# Patient Record
Sex: MALE | Race: WHITE | Employment: PART TIME | URBAN - METROPOLITAN AREA
[De-identification: names, ages, dates, MRNs, and addresses within clinical notes are randomized per-mention and may not be internally consistent; named-entity substitution may affect disease eponyms.]

---

## 2017-03-23 ENCOUNTER — HOSPITAL ENCOUNTER (OUTPATIENT)
Dept: PULMONOLOGY | Age: 72
Discharge: HOME | End: 2017-03-23
Attending: INTERNAL MEDICINE | Admitting: INTERNAL MEDICINE

## 2017-03-23 DIAGNOSIS — J44.0 CHRONIC OBSTRUCTIVE PULMONARY DISEASE WITH ACUTE LOWER RESPIRATORY INFECTION (HCC): ICD-10-CM

## 2018-01-25 ENCOUNTER — HOSPITAL ENCOUNTER (OUTPATIENT)
Dept: GENERAL RADIOLOGY | Age: 73
Discharge: HOME | End: 2018-01-25
Attending: INTERNAL MEDICINE | Admitting: INTERNAL MEDICINE

## 2018-01-25 LAB
ALBUMIN SERPL-MCNC: 3.7 GM/DL (ref 3.4–5)
ALP BLD-CCNC: 71 IU/L (ref 40–128)
ALT SERPL-CCNC: 15 U/L (ref 10–40)
ANION GAP SERPL CALCULATED.3IONS-SCNC: 12 MMOL/L (ref 4–16)
AST SERPL-CCNC: 19 IU/L (ref 15–37)
BASOPHILS ABSOLUTE: 0 K/CU MM
BASOPHILS RELATIVE PERCENT: 0.2 % (ref 0–1)
BILIRUB SERPL-MCNC: 0.4 MG/DL (ref 0–1)
BUN BLDV-MCNC: 12 MG/DL (ref 6–23)
CALCIUM SERPL-MCNC: 9.1 MG/DL (ref 8.3–10.6)
CHLORIDE BLD-SCNC: 99 MMOL/L (ref 99–110)
CHOLESTEROL: 185 MG/DL
CO2: 32 MMOL/L (ref 21–32)
CREAT SERPL-MCNC: 0.8 MG/DL (ref 0.9–1.3)
DIFFERENTIAL TYPE: ABNORMAL
EOSINOPHILS ABSOLUTE: 0.2 K/CU MM
EOSINOPHILS RELATIVE PERCENT: 4.7 % (ref 0–3)
GFR AFRICAN AMERICAN: >60 ML/MIN/1.73M2
GFR NON-AFRICAN AMERICAN: >60 ML/MIN/1.73M2
GLUCOSE BLD-MCNC: 108 MG/DL (ref 70–99)
HCT VFR BLD CALC: 50.2 % (ref 42–52)
HDLC SERPL-MCNC: 58 MG/DL
HEMOGLOBIN: 16.3 GM/DL (ref 13.5–18)
IMMATURE NEUTROPHIL %: 0.2 % (ref 0–0.43)
LDL CHOLESTEROL DIRECT: 121 MG/DL
LYMPHOCYTES ABSOLUTE: 0.9 K/CU MM
LYMPHOCYTES RELATIVE PERCENT: 18.3 % (ref 24–44)
MCH RBC QN AUTO: 33.8 PG (ref 27–31)
MCHC RBC AUTO-ENTMCNC: 32.5 % (ref 32–36)
MCV RBC AUTO: 104.1 FL (ref 78–100)
MONOCYTES ABSOLUTE: 0.7 K/CU MM
MONOCYTES RELATIVE PERCENT: 12.8 % (ref 0–4)
NUCLEATED RBC %: 0 %
PDW BLD-RTO: 13.2 % (ref 11.7–14.9)
PLATELET # BLD: 237 K/CU MM (ref 140–440)
PMV BLD AUTO: 9.2 FL (ref 7.5–11.1)
POTASSIUM SERPL-SCNC: 4.5 MMOL/L (ref 3.5–5.1)
RBC # BLD: 4.82 M/CU MM (ref 4.6–6.2)
SEGMENTED NEUTROPHILS ABSOLUTE COUNT: 3.3 K/CU MM
SEGMENTED NEUTROPHILS RELATIVE PERCENT: 63.8 % (ref 36–66)
SODIUM BLD-SCNC: 143 MMOL/L (ref 135–145)
TOTAL IMMATURE NEUTOROPHIL: 0.01 K/CU MM
TOTAL NUCLEATED RBC: 0 K/CU MM
TOTAL PROTEIN: 5.8 GM/DL (ref 6.4–8.2)
TRIGL SERPL-MCNC: 47 MG/DL
TSH HIGH SENSITIVITY: 0.97 UIU/ML (ref 0.27–4.2)
WBC # BLD: 5.2 K/CU MM (ref 4–10.5)

## 2018-10-18 ENCOUNTER — HOSPITAL ENCOUNTER (OUTPATIENT)
Age: 73
Discharge: HOME | End: 2018-10-18
Payer: MEDICARE

## 2018-10-18 LAB
ALBUMIN SERPL-MCNC: 3.8 GM/DL (ref 3.4–5)
ALP BLD-CCNC: 68 IU/L (ref 40–128)
ALT SERPL-CCNC: 12 U/L (ref 10–40)
ANION GAP SERPL CALCULATED.3IONS-SCNC: 13 MMOL/L (ref 4–16)
AST SERPL-CCNC: 18 IU/L (ref 15–37)
BILIRUB SERPL-MCNC: 0.5 MG/DL (ref 0–1)
BUN BLDV-MCNC: 12 MG/DL (ref 6–23)
CALCIUM SERPL-MCNC: 9 MG/DL (ref 8.3–10.6)
CHLORIDE BLD-SCNC: 97 MMOL/L (ref 99–110)
CHOLESTEROL: 182 MG/DL
CO2: 28 MMOL/L (ref 21–32)
CREAT SERPL-MCNC: 0.7 MG/DL (ref 0.9–1.3)
GFR AFRICAN AMERICAN: >60 ML/MIN/1.73M2
GFR NON-AFRICAN AMERICAN: >60 ML/MIN/1.73M2
GLUCOSE BLD-MCNC: 93 MG/DL (ref 70–99)
HCT VFR BLD CALC: 49.6 % (ref 42–52)
HDLC SERPL-MCNC: 69 MG/DL
HEMOGLOBIN: 16.3 GM/DL (ref 13.5–18)
LDL CHOLESTEROL DIRECT: 116 MG/DL
MCH RBC QN AUTO: 34.5 PG (ref 27–31)
MCHC RBC AUTO-ENTMCNC: 32.9 % (ref 32–36)
MCV RBC AUTO: 105.1 FL (ref 78–100)
PDW BLD-RTO: 12.9 % (ref 11.7–14.9)
PLATELET # BLD: 242 K/CU MM (ref 140–440)
PMV BLD AUTO: 9.1 FL (ref 7.5–11.1)
POTASSIUM SERPL-SCNC: 4.7 MMOL/L (ref 3.5–5.1)
PROSTATE SPECIFIC ANTIGEN: 2.17 NG/ML (ref 0–4)
RBC # BLD: 4.72 M/CU MM (ref 4.6–6.2)
SODIUM BLD-SCNC: 138 MMOL/L (ref 135–145)
TOTAL PROTEIN: 6.2 GM/DL (ref 6.4–8.2)
TRIGL SERPL-MCNC: 39 MG/DL
TSH HIGH SENSITIVITY: 0.78 UIU/ML (ref 0.27–4.2)
WBC # BLD: 7.1 K/CU MM (ref 4–10.5)

## 2018-10-18 PROCEDURE — 80061 LIPID PANEL: CPT

## 2018-10-18 PROCEDURE — 80053 COMPREHEN METABOLIC PANEL: CPT

## 2018-10-18 PROCEDURE — 84443 ASSAY THYROID STIM HORMONE: CPT

## 2018-10-18 PROCEDURE — G0103 PSA SCREENING: HCPCS

## 2018-10-18 PROCEDURE — 36415 COLL VENOUS BLD VENIPUNCTURE: CPT

## 2018-10-18 PROCEDURE — 83721 ASSAY OF BLOOD LIPOPROTEIN: CPT

## 2018-10-18 PROCEDURE — 85027 COMPLETE CBC AUTOMATED: CPT

## 2019-03-25 ENCOUNTER — HOSPITAL ENCOUNTER (OUTPATIENT)
Age: 74
Discharge: HOME | End: 2019-03-25
Payer: MEDICARE

## 2019-03-25 LAB
ALBUMIN SERPL-MCNC: 3.4 GM/DL (ref 3.4–5)
ALP BLD-CCNC: 64 IU/L (ref 40–129)
ALT SERPL-CCNC: 25 U/L (ref 10–40)
AST SERPL-CCNC: 29 IU/L (ref 15–37)
BASOPHILS ABSOLUTE: 0 K/CU MM
BASOPHILS RELATIVE PERCENT: 0.3 % (ref 0–1)
BILIRUB SERPL-MCNC: 0.2 MG/DL (ref 0–1)
BILIRUBIN DIRECT: 0.2 MG/DL (ref 0–0.3)
BILIRUBIN, INDIRECT: 0 MG/DL (ref 0–0.7)
DIFFERENTIAL TYPE: ABNORMAL
EOSINOPHILS ABSOLUTE: 0 K/CU MM
EOSINOPHILS RELATIVE PERCENT: 0 % (ref 0–3)
HCT VFR BLD CALC: 47.7 % (ref 42–52)
HEMOGLOBIN: 15 GM/DL (ref 13.5–18)
HEPATITIS B SURFACE ANTIGEN: NON REACTIVE
HEPATITIS C ANTIBODY: NON REACTIVE
IMMATURE NEUTROPHIL %: 1.3 % (ref 0–0.43)
LYMPHOCYTES ABSOLUTE: 0.7 K/CU MM
LYMPHOCYTES RELATIVE PERCENT: 10.5 % (ref 24–44)
MCH RBC QN AUTO: 33.2 PG (ref 27–31)
MCHC RBC AUTO-ENTMCNC: 31.4 % (ref 32–36)
MCV RBC AUTO: 105.5 FL (ref 78–100)
MONOCYTES ABSOLUTE: 0.2 K/CU MM
MONOCYTES RELATIVE PERCENT: 3.1 % (ref 0–4)
NUCLEATED RBC %: 0 %
PDW BLD-RTO: 12.9 % (ref 11.7–14.9)
PLATELET # BLD: 335 K/CU MM (ref 140–440)
PMV BLD AUTO: 9 FL (ref 7.5–11.1)
RBC # BLD: 4.52 M/CU MM (ref 4.6–6.2)
SEGMENTED NEUTROPHILS ABSOLUTE COUNT: 5.8 K/CU MM
SEGMENTED NEUTROPHILS RELATIVE PERCENT: 84.8 % (ref 36–66)
TOTAL IMMATURE NEUTOROPHIL: 0.09 K/CU MM
TOTAL NUCLEATED RBC: 0 K/CU MM
TOTAL PROTEIN: 5.9 GM/DL (ref 6.4–8.2)
TSH HIGH SENSITIVITY: 0.63 UIU/ML (ref 0.27–4.2)
WBC # BLD: 6.8 K/CU MM (ref 4–10.5)

## 2019-03-25 PROCEDURE — 86803 HEPATITIS C AB TEST: CPT

## 2019-03-25 PROCEDURE — 85025 COMPLETE CBC W/AUTO DIFF WBC: CPT

## 2019-03-25 PROCEDURE — 80076 HEPATIC FUNCTION PANEL: CPT

## 2019-03-25 PROCEDURE — 36415 COLL VENOUS BLD VENIPUNCTURE: CPT

## 2019-03-25 PROCEDURE — 86704 HEP B CORE ANTIBODY TOTAL: CPT

## 2019-03-25 PROCEDURE — 84443 ASSAY THYROID STIM HORMONE: CPT

## 2019-03-25 PROCEDURE — 87340 HEPATITIS B SURFACE AG IA: CPT

## 2019-03-27 LAB
HEPATITIS B CORE TOTAL ANTIBODY: NEGATIVE
HEPATITIS B CORE TOTAL ANTIBODY: NORMAL

## 2019-11-11 ENCOUNTER — HOSPITAL ENCOUNTER (OUTPATIENT)
Age: 74
Discharge: HOME | End: 2019-11-11
Payer: MEDICARE

## 2019-11-11 ENCOUNTER — HOSPITAL ENCOUNTER (OUTPATIENT)
Dept: GENERAL RADIOLOGY | Age: 74
Discharge: HOME | End: 2019-11-11
Payer: MEDICARE

## 2019-11-11 DIAGNOSIS — R05.9 COUGH: ICD-10-CM

## 2019-11-11 DIAGNOSIS — J44.9 CHRONIC OBSTRUCTIVE PULMONARY DISEASE, UNSPECIFIED COPD TYPE (HCC): ICD-10-CM

## 2019-11-11 LAB
ALBUMIN SERPL-MCNC: 3.4 GM/DL (ref 3.4–5)
ALP BLD-CCNC: 66 IU/L (ref 40–128)
ALT SERPL-CCNC: 14 U/L (ref 10–40)
ANION GAP SERPL CALCULATED.3IONS-SCNC: 14 MMOL/L (ref 4–16)
AST SERPL-CCNC: 17 IU/L (ref 15–37)
BASOPHILS ABSOLUTE: 0 K/CU MM
BASOPHILS RELATIVE PERCENT: 0.1 % (ref 0–1)
BILIRUB SERPL-MCNC: 0.4 MG/DL (ref 0–1)
BUN BLDV-MCNC: 24 MG/DL (ref 6–23)
CALCIUM SERPL-MCNC: 8.7 MG/DL (ref 8.3–10.6)
CHLORIDE BLD-SCNC: 99 MMOL/L (ref 99–110)
CHOLESTEROL, FASTING: 157 MG/DL
CO2: 28 MMOL/L (ref 21–32)
CREAT SERPL-MCNC: 0.8 MG/DL (ref 0.9–1.3)
DIFFERENTIAL TYPE: ABNORMAL
EOSINOPHILS ABSOLUTE: 0 K/CU MM
EOSINOPHILS RELATIVE PERCENT: 0 % (ref 0–3)
GFR AFRICAN AMERICAN: >60 ML/MIN/1.73M2
GFR NON-AFRICAN AMERICAN: >60 ML/MIN/1.73M2
GLUCOSE BLD-MCNC: 70 MG/DL (ref 70–99)
HCT VFR BLD CALC: 45.6 % (ref 42–52)
HDLC SERPL-MCNC: 79 MG/DL
HEMOGLOBIN: 14.4 GM/DL (ref 13.5–18)
IMMATURE NEUTROPHIL %: 0.5 % (ref 0–0.43)
LDL CHOLESTEROL DIRECT: 74 MG/DL
LYMPHOCYTES ABSOLUTE: 0.4 K/CU MM
LYMPHOCYTES RELATIVE PERCENT: 3.1 % (ref 24–44)
MCH RBC QN AUTO: 33.9 PG (ref 27–31)
MCHC RBC AUTO-ENTMCNC: 31.6 % (ref 32–36)
MCV RBC AUTO: 107.3 FL (ref 78–100)
MONOCYTES ABSOLUTE: 0.4 K/CU MM
MONOCYTES RELATIVE PERCENT: 3.4 % (ref 0–4)
NUCLEATED RBC %: 0 %
PDW BLD-RTO: 14.6 % (ref 11.7–14.9)
PLATELET # BLD: 241 K/CU MM (ref 140–440)
PMV BLD AUTO: 9.1 FL (ref 7.5–11.1)
POTASSIUM SERPL-SCNC: 4.5 MMOL/L (ref 3.5–5.1)
RBC # BLD: 4.25 M/CU MM (ref 4.6–6.2)
SEGMENTED NEUTROPHILS ABSOLUTE COUNT: 11.9 K/CU MM
SEGMENTED NEUTROPHILS RELATIVE PERCENT: 92.9 % (ref 36–66)
SODIUM BLD-SCNC: 141 MMOL/L (ref 135–145)
TOTAL IMMATURE NEUTOROPHIL: 0.06 K/CU MM
TOTAL NUCLEATED RBC: 0 K/CU MM
TOTAL PROTEIN: 5.8 GM/DL (ref 6.4–8.2)
TRIGLYCERIDE, FASTING: 58 MG/DL
TSH HIGH SENSITIVITY: 0.96 UIU/ML (ref 0.27–4.2)
WBC # BLD: 12.8 K/CU MM (ref 4–10.5)

## 2019-11-11 PROCEDURE — 80061 LIPID PANEL: CPT

## 2019-11-11 PROCEDURE — 80053 COMPREHEN METABOLIC PANEL: CPT

## 2019-11-11 PROCEDURE — 84443 ASSAY THYROID STIM HORMONE: CPT

## 2019-11-11 PROCEDURE — 71046 X-RAY EXAM CHEST 2 VIEWS: CPT

## 2019-11-11 PROCEDURE — 85025 COMPLETE CBC W/AUTO DIFF WBC: CPT

## 2019-11-11 PROCEDURE — 36415 COLL VENOUS BLD VENIPUNCTURE: CPT

## 2020-03-02 ENCOUNTER — HOSPITAL ENCOUNTER (OUTPATIENT)
Dept: GENERAL RADIOLOGY | Age: 75
Discharge: HOME | End: 2020-03-02
Payer: MEDICARE

## 2020-03-02 ENCOUNTER — HOSPITAL ENCOUNTER (OUTPATIENT)
Age: 75
Discharge: HOME | End: 2020-03-02
Payer: MEDICARE

## 2020-03-02 PROCEDURE — 71046 X-RAY EXAM CHEST 2 VIEWS: CPT

## 2020-03-09 PROBLEM — J44.9 COPD, SEVERE (HCC): Status: ACTIVE | Noted: 2020-03-09

## 2020-05-05 ENCOUNTER — HOSPITAL ENCOUNTER (OUTPATIENT)
Age: 75
Discharge: HOME | End: 2020-05-05
Payer: MEDICARE

## 2020-05-05 ENCOUNTER — HOSPITAL ENCOUNTER (OUTPATIENT)
Dept: GENERAL RADIOLOGY | Age: 75
Discharge: HOME | End: 2020-05-05
Payer: MEDICARE

## 2020-05-05 PROCEDURE — 71046 X-RAY EXAM CHEST 2 VIEWS: CPT

## 2021-11-29 ENCOUNTER — HOSPITAL ENCOUNTER (OUTPATIENT)
Age: 76
Discharge: HOME | End: 2021-11-29
Payer: MEDICARE

## 2021-11-29 LAB
ALBUMIN SERPL-MCNC: 3.9 GM/DL (ref 3.4–5)
ALP BLD-CCNC: 83 IU/L (ref 40–128)
ALT SERPL-CCNC: 12 U/L (ref 10–40)
ANION GAP SERPL CALCULATED.3IONS-SCNC: 6 MMOL/L (ref 4–16)
AST SERPL-CCNC: 18 IU/L (ref 15–37)
BASOPHILS ABSOLUTE: 0 K/CU MM
BASOPHILS RELATIVE PERCENT: 0.4 % (ref 0–1)
BILIRUB SERPL-MCNC: 0.5 MG/DL (ref 0–1)
BUN BLDV-MCNC: 19 MG/DL (ref 6–23)
C-REACTIVE PROTEIN, HIGH SENSITIVITY: 3.2 MG/L
CALCIUM SERPL-MCNC: 9.1 MG/DL (ref 8.3–10.6)
CHLORIDE BLD-SCNC: 101 MMOL/L (ref 99–110)
CHOLESTEROL: 188 MG/DL
CO2: 32 MMOL/L (ref 21–32)
CREAT SERPL-MCNC: 0.7 MG/DL (ref 0.9–1.3)
DIFFERENTIAL TYPE: ABNORMAL
EOSINOPHILS ABSOLUTE: 0.3 K/CU MM
EOSINOPHILS RELATIVE PERCENT: 4.9 % (ref 0–3)
GFR AFRICAN AMERICAN: >60 ML/MIN/1.73M2
GFR NON-AFRICAN AMERICAN: >60 ML/MIN/1.73M2
GLUCOSE BLD-MCNC: 84 MG/DL (ref 70–99)
HCT VFR BLD CALC: 46.9 % (ref 42–52)
HDLC SERPL-MCNC: 65 MG/DL
HEMOGLOBIN: 15.4 GM/DL (ref 13.5–18)
IMMATURE NEUTROPHIL %: 0.2 % (ref 0–0.43)
LDL CHOLESTEROL DIRECT: 114 MG/DL
LYMPHOCYTES ABSOLUTE: 1.6 K/CU MM
LYMPHOCYTES RELATIVE PERCENT: 29.8 % (ref 24–44)
MCH RBC QN AUTO: 33.5 PG (ref 27–31)
MCHC RBC AUTO-ENTMCNC: 32.8 % (ref 32–36)
MCV RBC AUTO: 102 FL (ref 78–100)
MONOCYTES ABSOLUTE: 0.5 K/CU MM
MONOCYTES RELATIVE PERCENT: 9.7 % (ref 0–4)
NUCLEATED RBC %: 0 %
PDW BLD-RTO: 13.1 % (ref 11.7–14.9)
PLATELET # BLD: 229 K/CU MM (ref 140–440)
PMV BLD AUTO: 9.1 FL (ref 7.5–11.1)
POTASSIUM SERPL-SCNC: 4.7 MMOL/L (ref 3.5–5.1)
PROSTATE SPECIFIC ANTIGEN: 2.91 NG/ML (ref 0–4)
RBC # BLD: 4.6 M/CU MM (ref 4.6–6.2)
SEGMENTED NEUTROPHILS ABSOLUTE COUNT: 2.9 K/CU MM
SEGMENTED NEUTROPHILS RELATIVE PERCENT: 55 % (ref 36–66)
SODIUM BLD-SCNC: 139 MMOL/L (ref 135–145)
T4 FREE: 1.11 NG/DL (ref 0.9–1.8)
TOTAL IMMATURE NEUTOROPHIL: 0.01 K/CU MM
TOTAL NUCLEATED RBC: 0 K/CU MM
TOTAL PROTEIN: 6.1 GM/DL (ref 6.4–8.2)
TRIGL SERPL-MCNC: 55 MG/DL
TSH HIGH SENSITIVITY: 1.6 UIU/ML (ref 0.27–4.2)
WBC # BLD: 5.3 K/CU MM (ref 4–10.5)

## 2021-11-29 PROCEDURE — 83721 ASSAY OF BLOOD LIPOPROTEIN: CPT

## 2021-11-29 PROCEDURE — 85025 COMPLETE CBC W/AUTO DIFF WBC: CPT

## 2021-11-29 PROCEDURE — 86140 C-REACTIVE PROTEIN: CPT

## 2021-11-29 PROCEDURE — 36415 COLL VENOUS BLD VENIPUNCTURE: CPT

## 2021-11-29 PROCEDURE — 80061 LIPID PANEL: CPT

## 2021-11-29 PROCEDURE — G0103 PSA SCREENING: HCPCS

## 2021-11-29 PROCEDURE — 80053 COMPREHEN METABOLIC PANEL: CPT

## 2021-11-29 PROCEDURE — 84439 ASSAY OF FREE THYROXINE: CPT

## 2021-11-29 PROCEDURE — 84443 ASSAY THYROID STIM HORMONE: CPT

## 2022-07-18 ENCOUNTER — HOSPITAL ENCOUNTER (OUTPATIENT)
Age: 77
Discharge: HOME | End: 2022-07-18
Payer: MEDICARE

## 2022-07-18 LAB
ALBUMIN SERPL-MCNC: 3.9 GM/DL (ref 3.4–5)
ALP BLD-CCNC: 77 IU/L (ref 40–129)
ALT SERPL-CCNC: 14 U/L (ref 10–40)
ANION GAP SERPL CALCULATED.3IONS-SCNC: 7 MMOL/L (ref 4–16)
AST SERPL-CCNC: 21 IU/L (ref 15–37)
BASOPHILS ABSOLUTE: 0 K/CU MM
BASOPHILS RELATIVE PERCENT: 0.4 % (ref 0–1)
BILIRUB SERPL-MCNC: 0.4 MG/DL (ref 0–1)
BUN BLDV-MCNC: 20 MG/DL (ref 6–23)
CALCIUM SERPL-MCNC: 8.8 MG/DL (ref 8.3–10.6)
CHLORIDE BLD-SCNC: 106 MMOL/L (ref 99–110)
CO2: 30 MMOL/L (ref 21–32)
CREAT SERPL-MCNC: 0.8 MG/DL (ref 0.9–1.3)
DIFFERENTIAL TYPE: ABNORMAL
EOSINOPHILS ABSOLUTE: 0.1 K/CU MM
EOSINOPHILS RELATIVE PERCENT: 2.2 % (ref 0–3)
GFR AFRICAN AMERICAN: >60 ML/MIN/1.73M2
GFR NON-AFRICAN AMERICAN: >60 ML/MIN/1.73M2
GLUCOSE BLD-MCNC: 66 MG/DL (ref 70–99)
HCT VFR BLD CALC: 45.2 % (ref 42–52)
HEMOGLOBIN: 14.7 GM/DL (ref 13.5–18)
IMMATURE NEUTROPHIL %: 0.2 % (ref 0–0.43)
LYMPHOCYTES ABSOLUTE: 1.5 K/CU MM
LYMPHOCYTES RELATIVE PERCENT: 27.5 % (ref 24–44)
MCH RBC QN AUTO: 33.6 PG (ref 27–31)
MCHC RBC AUTO-ENTMCNC: 32.5 % (ref 32–36)
MCV RBC AUTO: 103.2 FL (ref 78–100)
MONOCYTES ABSOLUTE: 0.5 K/CU MM
MONOCYTES RELATIVE PERCENT: 9.1 % (ref 0–4)
NUCLEATED RBC %: 0 %
PDW BLD-RTO: 13.4 % (ref 11.7–14.9)
PLATELET # BLD: 206 K/CU MM (ref 140–440)
PMV BLD AUTO: 9.2 FL (ref 7.5–11.1)
POTASSIUM SERPL-SCNC: 4.8 MMOL/L (ref 3.5–5.1)
RBC # BLD: 4.38 M/CU MM (ref 4.6–6.2)
SEGMENTED NEUTROPHILS ABSOLUTE COUNT: 3.3 K/CU MM
SEGMENTED NEUTROPHILS RELATIVE PERCENT: 60.6 % (ref 36–66)
SODIUM BLD-SCNC: 143 MMOL/L (ref 135–145)
TOTAL IMMATURE NEUTOROPHIL: 0.01 K/CU MM
TOTAL NUCLEATED RBC: 0 K/CU MM
TOTAL PROTEIN: 5.7 GM/DL (ref 6.4–8.2)
WBC # BLD: 5.4 K/CU MM (ref 4–10.5)

## 2022-07-18 PROCEDURE — 85025 COMPLETE CBC W/AUTO DIFF WBC: CPT

## 2022-07-18 PROCEDURE — 36415 COLL VENOUS BLD VENIPUNCTURE: CPT

## 2022-07-18 PROCEDURE — 82955 ASSAY OF G6PD ENZYME: CPT

## 2022-07-18 PROCEDURE — 80053 COMPREHEN METABOLIC PANEL: CPT

## 2022-07-20 LAB — GLUCOSE-6-PHOSPHATE DEHYDROGENASE QUAL: 10.3 U/G HB (ref 9.9–16.6)

## 2022-11-23 ENCOUNTER — HOSPITAL ENCOUNTER (OUTPATIENT)
Age: 77
Discharge: HOME | End: 2022-11-23
Payer: MEDICARE

## 2022-11-23 LAB
ALBUMIN SERPL-MCNC: 3.8 GM/DL (ref 3.4–5)
ALP BLD-CCNC: 78 IU/L (ref 40–128)
ALT SERPL-CCNC: 14 U/L (ref 10–40)
ANION GAP SERPL CALCULATED.3IONS-SCNC: 8 MMOL/L (ref 4–16)
AST SERPL-CCNC: 17 IU/L (ref 15–37)
BASOPHILS ABSOLUTE: 0 K/CU MM
BASOPHILS RELATIVE PERCENT: 0.3 % (ref 0–1)
BILIRUB SERPL-MCNC: 0.6 MG/DL (ref 0–1)
BUN BLDV-MCNC: 15 MG/DL (ref 6–23)
CALCIUM SERPL-MCNC: 9.2 MG/DL (ref 8.3–10.6)
CHLORIDE BLD-SCNC: 99 MMOL/L (ref 99–110)
CHOLESTEROL: 193 MG/DL
CO2: 29 MMOL/L (ref 21–32)
CREAT SERPL-MCNC: 0.6 MG/DL (ref 0.9–1.3)
DIFFERENTIAL TYPE: ABNORMAL
EOSINOPHILS ABSOLUTE: 0.1 K/CU MM
EOSINOPHILS RELATIVE PERCENT: 1.3 % (ref 0–3)
GFR SERPL CREATININE-BSD FRML MDRD: >60 ML/MIN/1.73M2
GLUCOSE BLD-MCNC: 95 MG/DL (ref 70–99)
HCT VFR BLD CALC: 45.5 % (ref 42–52)
HDLC SERPL-MCNC: 62 MG/DL
HEMOGLOBIN: 14.4 GM/DL (ref 13.5–18)
IMMATURE NEUTROPHIL %: 0.3 % (ref 0–0.43)
LDL CHOLESTEROL CALCULATED: 124 MG/DL
LDL CHOLESTEROL DIRECT: 122 MG/DL
LYMPHOCYTES ABSOLUTE: 1.2 K/CU MM
LYMPHOCYTES RELATIVE PERCENT: 16.9 % (ref 24–44)
MCH RBC QN AUTO: 33.2 PG (ref 27–31)
MCHC RBC AUTO-ENTMCNC: 31.6 % (ref 32–36)
MCV RBC AUTO: 104.8 FL (ref 78–100)
MONOCYTES ABSOLUTE: 0.6 K/CU MM
MONOCYTES RELATIVE PERCENT: 9 % (ref 0–4)
NUCLEATED RBC %: 0 %
PDW BLD-RTO: 12.9 % (ref 11.7–14.9)
PLATELET # BLD: 290 K/CU MM (ref 140–440)
PMV BLD AUTO: 8.9 FL (ref 7.5–11.1)
POTASSIUM SERPL-SCNC: 4.5 MMOL/L (ref 3.5–5.1)
RBC # BLD: 4.34 M/CU MM (ref 4.6–6.2)
SEGMENTED NEUTROPHILS ABSOLUTE COUNT: 4.9 K/CU MM
SEGMENTED NEUTROPHILS RELATIVE PERCENT: 72.2 % (ref 36–66)
SODIUM BLD-SCNC: 136 MMOL/L (ref 135–145)
T4 FREE: 1.3 NG/DL (ref 0.9–1.8)
TOTAL IMMATURE NEUTOROPHIL: 0.02 K/CU MM
TOTAL NUCLEATED RBC: 0 K/CU MM
TOTAL PROTEIN: 6.1 GM/DL (ref 6.4–8.2)
TRIGL SERPL-MCNC: 37 MG/DL
TSH HIGH SENSITIVITY: 0.98 UIU/ML (ref 0.27–4.2)
WBC # BLD: 6.8 K/CU MM (ref 4–10.5)

## 2022-11-23 PROCEDURE — 80053 COMPREHEN METABOLIC PANEL: CPT

## 2022-11-23 PROCEDURE — 83721 ASSAY OF BLOOD LIPOPROTEIN: CPT

## 2022-11-23 PROCEDURE — 84443 ASSAY THYROID STIM HORMONE: CPT

## 2022-11-23 PROCEDURE — 36415 COLL VENOUS BLD VENIPUNCTURE: CPT

## 2022-11-23 PROCEDURE — 80061 LIPID PANEL: CPT

## 2022-11-23 PROCEDURE — 84439 ASSAY OF FREE THYROXINE: CPT

## 2022-11-23 PROCEDURE — 85025 COMPLETE CBC W/AUTO DIFF WBC: CPT

## 2023-08-17 ENCOUNTER — HOSPITAL ENCOUNTER (OUTPATIENT)
Age: 78
Discharge: HOME | End: 2023-08-17
Payer: MEDICARE

## 2023-08-17 LAB
ALBUMIN SERPL-MCNC: 3.9 GM/DL (ref 3.4–5)
ALP BLD-CCNC: 87 IU/L (ref 40–129)
ALT SERPL-CCNC: 16 U/L (ref 10–40)
ANION GAP SERPL CALCULATED.3IONS-SCNC: 8 MMOL/L (ref 4–16)
AST SERPL-CCNC: 19 IU/L (ref 15–37)
BASOPHILS ABSOLUTE: 0 K/CU MM
BASOPHILS RELATIVE PERCENT: 0.4 % (ref 0–1)
BILIRUB SERPL-MCNC: 0.4 MG/DL (ref 0–1)
BUN SERPL-MCNC: 21 MG/DL (ref 6–23)
CALCIUM SERPL-MCNC: 8.5 MG/DL (ref 8.3–10.6)
CHLORIDE BLD-SCNC: 101 MMOL/L (ref 99–110)
CO2: 29 MMOL/L (ref 21–32)
CREAT SERPL-MCNC: 1.1 MG/DL (ref 0.9–1.3)
DIFFERENTIAL TYPE: ABNORMAL
EOSINOPHILS ABSOLUTE: 0.1 K/CU MM
EOSINOPHILS RELATIVE PERCENT: 2.3 % (ref 0–3)
GFR SERPL CREATININE-BSD FRML MDRD: >60 ML/MIN/1.73M2
GLUCOSE SERPL-MCNC: 84 MG/DL (ref 70–99)
HCT VFR BLD CALC: 45.5 % (ref 42–52)
HEMOGLOBIN: 14.5 GM/DL (ref 13.5–18)
IMMATURE NEUTROPHIL %: 0.2 % (ref 0–0.43)
LYMPHOCYTES ABSOLUTE: 1.1 K/CU MM
LYMPHOCYTES RELATIVE PERCENT: 20 % (ref 24–44)
MCH RBC QN AUTO: 32.8 PG (ref 27–31)
MCHC RBC AUTO-ENTMCNC: 31.9 % (ref 32–36)
MCV RBC AUTO: 102.9 FL (ref 78–100)
MONOCYTES ABSOLUTE: 0.6 K/CU MM
MONOCYTES RELATIVE PERCENT: 10 % (ref 0–4)
NUCLEATED RBC %: 0 %
PDW BLD-RTO: 12.9 % (ref 11.7–14.9)
PLATELET # BLD: 236 K/CU MM (ref 140–440)
PMV BLD AUTO: 9.2 FL (ref 7.5–11.1)
POTASSIUM SERPL-SCNC: 4.6 MMOL/L (ref 3.5–5.1)
RBC # BLD: 4.42 M/CU MM (ref 4.6–6.2)
SEGMENTED NEUTROPHILS ABSOLUTE COUNT: 3.8 K/CU MM
SEGMENTED NEUTROPHILS RELATIVE PERCENT: 67.1 % (ref 36–66)
SODIUM BLD-SCNC: 138 MMOL/L (ref 135–145)
TOTAL IMMATURE NEUTOROPHIL: 0.01 K/CU MM
TOTAL NUCLEATED RBC: 0 K/CU MM
TOTAL PROTEIN: 6 GM/DL (ref 6.4–8.2)
WBC # BLD: 5.6 K/CU MM (ref 4–10.5)

## 2023-08-17 PROCEDURE — 80053 COMPREHEN METABOLIC PANEL: CPT

## 2023-08-17 PROCEDURE — 36415 COLL VENOUS BLD VENIPUNCTURE: CPT

## 2023-08-17 PROCEDURE — 85025 COMPLETE CBC W/AUTO DIFF WBC: CPT

## 2023-09-14 ENCOUNTER — HOSPITAL ENCOUNTER (OUTPATIENT)
Age: 78
Discharge: HOME | End: 2023-09-14
Payer: MEDICARE

## 2023-09-14 ENCOUNTER — HOSPITAL ENCOUNTER (OUTPATIENT)
Dept: GENERAL RADIOLOGY | Age: 78
Discharge: HOME | End: 2023-09-14
Payer: MEDICARE

## 2023-09-14 DIAGNOSIS — R06.09 DOE (DYSPNEA ON EXERTION): ICD-10-CM

## 2023-09-14 PROCEDURE — 71046 X-RAY EXAM CHEST 2 VIEWS: CPT

## 2023-12-11 ENCOUNTER — HOSPITAL ENCOUNTER (OUTPATIENT)
Age: 78
Discharge: HOME | End: 2023-12-11
Payer: MEDICARE

## 2023-12-11 LAB
ALBUMIN SERPL-MCNC: 4 GM/DL (ref 3.4–5)
ALP BLD-CCNC: 100 IU/L (ref 40–129)
ALT SERPL-CCNC: 19 U/L (ref 10–40)
ANION GAP SERPL CALCULATED.3IONS-SCNC: 10 MMOL/L (ref 4–16)
AST SERPL-CCNC: 24 IU/L (ref 15–37)
BASOPHILS ABSOLUTE: 0 K/CU MM
BASOPHILS RELATIVE PERCENT: 0.2 % (ref 0–1)
BILIRUB SERPL-MCNC: 0.7 MG/DL (ref 0–1)
BUN SERPL-MCNC: 14 MG/DL (ref 6–23)
CALCIUM SERPL-MCNC: 8.8 MG/DL (ref 8.3–10.6)
CHLORIDE BLD-SCNC: 102 MMOL/L (ref 99–110)
CO2: 30 MMOL/L (ref 21–32)
CREAT SERPL-MCNC: 0.7 MG/DL (ref 0.9–1.3)
DIFFERENTIAL TYPE: ABNORMAL
EOSINOPHILS ABSOLUTE: 0 K/CU MM
EOSINOPHILS RELATIVE PERCENT: 0.1 % (ref 0–3)
GFR SERPL CREATININE-BSD FRML MDRD: >60 ML/MIN/1.73M2
GLUCOSE SERPL-MCNC: 126 MG/DL (ref 70–99)
HCT VFR BLD CALC: 48.4 % (ref 42–52)
HEMOGLOBIN: 15.4 GM/DL (ref 13.5–18)
IMMATURE NEUTROPHIL %: 1 % (ref 0–0.43)
LYMPHOCYTES ABSOLUTE: 0.6 K/CU MM
LYMPHOCYTES RELATIVE PERCENT: 6.3 % (ref 24–44)
MCH RBC QN AUTO: 32.5 PG (ref 27–31)
MCHC RBC AUTO-ENTMCNC: 31.8 % (ref 32–36)
MCV RBC AUTO: 102.1 FL (ref 78–100)
MONOCYTES ABSOLUTE: 0.6 K/CU MM
MONOCYTES RELATIVE PERCENT: 6.3 % (ref 0–4)
NUCLEATED RBC %: 0 %
PDW BLD-RTO: 13.3 % (ref 11.7–14.9)
PLATELET # BLD: 232 K/CU MM (ref 140–440)
PMV BLD AUTO: 9.2 FL (ref 7.5–11.1)
POTASSIUM SERPL-SCNC: 5.1 MMOL/L (ref 3.5–5.1)
RBC # BLD: 4.74 M/CU MM (ref 4.6–6.2)
SEGMENTED NEUTROPHILS ABSOLUTE COUNT: 8.1 K/CU MM
SEGMENTED NEUTROPHILS RELATIVE PERCENT: 86.1 % (ref 36–66)
SODIUM BLD-SCNC: 142 MMOL/L (ref 135–145)
TOTAL IMMATURE NEUTOROPHIL: 0.09 K/CU MM
TOTAL NUCLEATED RBC: 0 K/CU MM
TOTAL PROTEIN: 6.7 GM/DL (ref 6.4–8.2)
WBC # BLD: 9.4 K/CU MM (ref 4–10.5)

## 2023-12-11 PROCEDURE — 80061 LIPID PANEL: CPT

## 2023-12-11 PROCEDURE — 86140 C-REACTIVE PROTEIN: CPT

## 2023-12-11 PROCEDURE — 85025 COMPLETE CBC W/AUTO DIFF WBC: CPT

## 2023-12-11 PROCEDURE — 80053 COMPREHEN METABOLIC PANEL: CPT

## 2023-12-11 PROCEDURE — 36415 COLL VENOUS BLD VENIPUNCTURE: CPT

## 2023-12-11 PROCEDURE — G0103 PSA SCREENING: HCPCS

## 2023-12-13 LAB
CHOLEST SERPL-MCNC: 182 MG/DL
CRP SERPL HS-MCNC: 10.7 MG/L
HDLC SERPL-MCNC: 64 MG/DL
LDLC SERPL CALC-MCNC: 109 MG/DL
PROSTATE SPECIFIC ANTIGEN: 3.28 NG/ML (ref 0–4)
TRIGL SERPL-MCNC: 45 MG/DL

## 2023-12-19 PROBLEM — N40.0 BPH (BENIGN PROSTATIC HYPERPLASIA): Status: ACTIVE | Noted: 2023-12-19

## 2024-02-15 ENCOUNTER — OFFICE VISIT (OUTPATIENT)
Dept: FAMILY MEDICINE CLINIC | Age: 79
End: 2024-02-15
Payer: MEDICARE

## 2024-02-15 VITALS
WEIGHT: 144 LBS | BODY MASS INDEX: 23.14 KG/M2 | SYSTOLIC BLOOD PRESSURE: 126 MMHG | HEIGHT: 66 IN | OXYGEN SATURATION: 93 % | TEMPERATURE: 97.3 F | HEART RATE: 90 BPM | DIASTOLIC BLOOD PRESSURE: 80 MMHG

## 2024-02-15 DIAGNOSIS — J40 BRONCHITIS: ICD-10-CM

## 2024-02-15 DIAGNOSIS — J44.9 COPD, SEVERE (HCC): Primary | ICD-10-CM

## 2024-02-15 PROCEDURE — 99213 OFFICE O/P EST LOW 20 MIN: CPT | Performed by: INTERNAL MEDICINE

## 2024-02-15 PROCEDURE — 1123F ACP DISCUSS/DSCN MKR DOCD: CPT | Performed by: INTERNAL MEDICINE

## 2024-02-15 RX ORDER — PREDNISONE 10 MG/1
TABLET ORAL
Qty: 20 TABLET | Refills: 0 | Status: SHIPPED | OUTPATIENT
Start: 2024-02-15

## 2024-02-15 RX ORDER — TAMSULOSIN HYDROCHLORIDE 0.4 MG/1
0.8 CAPSULE ORAL DAILY
Qty: 60 CAPSULE | Refills: 5 | Status: SHIPPED | OUTPATIENT
Start: 2024-02-15 | End: 2024-08-13

## 2024-02-15 RX ORDER — DAPSONE 25 MG/1
TABLET ORAL
COMMUNITY
Start: 2024-01-22

## 2024-02-15 RX ORDER — CLARITHROMYCIN 250 MG/1
250 TABLET, FILM COATED ORAL 2 TIMES DAILY
Qty: 20 TABLET | Refills: 0 | Status: SHIPPED | OUTPATIENT
Start: 2024-02-15 | End: 2024-02-25

## 2024-02-15 RX ORDER — ASPIRIN 81 MG/1
TABLET, CHEWABLE ORAL
COMMUNITY
Start: 2023-10-24

## 2024-02-15 RX ORDER — MULTIVIT WITH MINERALS/LUTEIN
1000 TABLET ORAL
COMMUNITY
Start: 2023-10-24

## 2024-02-15 NOTE — PROGRESS NOTES
Outpatient Clinic Visit Note    Patient: Kofi Pineda  : 1945 (79 y.o.)  Date: 2/15/2024    CC:  Chief Complaint   Patient presents with    Congestion    Cough     Patient has congestion, productive cough and chills/sweats        HPI: mildly yellow sputum with occ chills.    Past Medical History:    Past Medical History:   Diagnosis Date    COPD (chronic obstructive pulmonary disease) (HCC)     Emphysema of lung (HCC)        Past Surgical History:  History reviewed. No pertinent surgical history.    Home Medications:  Current Outpatient Medications   Medication Sig Dispense Refill    Multiple Vitamin (MVI, CELEBRATE, CHEWABLE TABLET) Take by mouth      Ascorbic Acid (VITAMIN C) 1000 MG tablet Take 1 tablet by mouth      aspirin 81 MG chewable tablet Take by mouth      dapsone 25 MG tablet       tamsulosin (FLOMAX) 0.4 MG capsule Take 2 capsules by mouth daily 60 capsule 5    clarithromycin (BIAXIN) 250 MG tablet Take 1 tablet by mouth 2 times daily for 10 days 20 tablet 0    predniSONE (DELTASONE) 10 MG tablet 4 for 2 days, 3 for 2 days, 2 for 2 days, 1 for 2 days 20 tablet 0    BREZTRI AEROSPHERE 160-9-4.8 MCG/ACT AERO INHALE TWO PUFFS BY MOUTH TWICE A DAY 10.7 g 5    triamcinolone (KENALOG) 0.1 % ointment       montelukast (SINGULAIR) 10 MG tablet       albuterol sulfate HFA (PROVENTIL;VENTOLIN;PROAIR) 108 (90 Base) MCG/ACT inhaler Inhale 2 puffs into the lungs every 6 hours as needed for Wheezing       No current facility-administered medications for this visit.        Allergies:    Patient has no known allergies.    Family History:   History reviewed. No pertinent family history.     ROS: A 10-organ Review Of Systems was obtained and otherwise unremarkable except as per HPI.    Data: Old records have been reviewed electronically.    PHYSICAL EXAM:  /80 (Site: Right Upper Arm, Position: Sitting, Cuff Size: Large Adult)   Pulse 90   Temp 97.3 °F (36.3 °C)   Ht 1.676 m (5' 5.98\")   Wt 65.3 kg

## 2024-02-16 ENCOUNTER — TELEPHONE (OUTPATIENT)
Dept: FAMILY MEDICINE CLINIC | Age: 79
End: 2024-02-16

## 2024-02-16 NOTE — TELEPHONE ENCOUNTER
To Dr. Nicholson-    Re:    tamsulosin (FLOMAX) 0.4 MG capsule       clarithromycin (BIAXIN) 250 MG tablet     Please note the Biaxin will increase the strength of the Tamsulosin.    Please advise.

## 2024-02-21 ENCOUNTER — TELEPHONE (OUTPATIENT)
Dept: FAMILY MEDICINE CLINIC | Age: 79
End: 2024-02-21

## 2024-02-21 NOTE — TELEPHONE ENCOUNTER
PT WILL BE FINISHED W/PREDNISONE ON FRI AND WANT'S TO KNOW IF HE NEEDS ANOTHER ROUND OF THIS. STILL HAS A BIT IN HIS CHEST. PLEASE ADVISE

## 2024-02-22 ENCOUNTER — TELEPHONE (OUTPATIENT)
Dept: FAMILY MEDICINE CLINIC | Age: 79
End: 2024-02-22

## 2024-02-22 NOTE — TELEPHONE ENCOUNTER
Spoke with pt who states finishing prednisone 2/23/24. Still taking clarithromycin 250 mg 1 bid 10 days - pt started atb 2/18/24 - cough has improved not resolved, does have some sob, still having chest congestion although improving    Rayo chung

## 2024-02-27 ENCOUNTER — TELEPHONE (OUTPATIENT)
Dept: FAMILY MEDICINE CLINIC | Age: 79
End: 2024-02-27

## 2024-02-27 NOTE — TELEPHONE ENCOUNTER
Patient called and stated that he has one more day of his antibiotic and still is not feeling well.  Still having breathing SOB ,coughing up phlegm pale yellow. Patient was seen 2-15-24 in this office.Marcelino is requesting a call back   Rayo Devi

## 2024-02-28 RX ORDER — PREDNISONE 10 MG/1
10 TABLET ORAL 2 TIMES DAILY
Qty: 10 TABLET | Refills: 0 | Status: SHIPPED | OUTPATIENT
Start: 2024-02-28 | End: 2024-03-04

## 2024-02-28 NOTE — TELEPHONE ENCOUNTER
Patient is still coughing up little colored mucus.  He states he is still having some breathing problems.  Patient took his last antibiotic yesterday.  Patient does not want a CT he would like to have more prednisone and or antibiotic

## 2024-03-05 ENCOUNTER — TELEPHONE (OUTPATIENT)
Dept: FAMILY MEDICINE CLINIC | Age: 79
End: 2024-03-05

## 2024-03-05 NOTE — TELEPHONE ENCOUNTER
To Dr. Nicholson-    Patient is almost finished taking the: predniSONE (DELTASONE) 10 MG tablet     Patient is still congested and coughing---what should he do now?    Please advise.

## 2024-03-06 NOTE — TELEPHONE ENCOUNTER
Patient states he is done with prescription medications.  Still having cough/congestion but breathing has improved.  Advised to continue the mucinex and treat cough with OTC cough meds/cough drops etc.  Wanted to make sure nothing else needed sent in

## 2024-03-18 ENCOUNTER — TELEPHONE (OUTPATIENT)
Dept: FAMILY MEDICINE CLINIC | Age: 79
End: 2024-03-18

## 2024-03-18 DIAGNOSIS — J40 BRONCHITIS: Primary | ICD-10-CM

## 2024-03-18 NOTE — TELEPHONE ENCOUNTER
To Dr. Nicholson-    Patient was treated for bronchitis a while ago and he is still having problems----do you want him to do any imaging?    Please call and let him know what you want to do.

## 2024-03-20 ENCOUNTER — HOSPITAL ENCOUNTER (OUTPATIENT)
Age: 79
Discharge: HOME | End: 2024-03-20
Payer: MEDICARE

## 2024-03-20 ENCOUNTER — HOSPITAL ENCOUNTER (OUTPATIENT)
Dept: GENERAL RADIOLOGY | Age: 79
Discharge: HOME | End: 2024-03-20
Payer: MEDICARE

## 2024-03-20 DIAGNOSIS — J40 BRONCHITIS: ICD-10-CM

## 2024-03-20 PROCEDURE — 71046 X-RAY EXAM CHEST 2 VIEWS: CPT

## 2024-03-21 ENCOUNTER — TELEPHONE (OUTPATIENT)
Dept: FAMILY MEDICINE CLINIC | Age: 79
End: 2024-03-21

## 2024-03-22 RX ORDER — CLARITHROMYCIN 250 MG/1
250 TABLET, FILM COATED ORAL 2 TIMES DAILY
Qty: 20 TABLET | Refills: 0 | Status: SHIPPED | OUTPATIENT
Start: 2024-03-22 | End: 2024-04-01

## 2024-04-01 ENCOUNTER — TELEPHONE (OUTPATIENT)
Dept: FAMILY MEDICINE CLINIC | Age: 79
End: 2024-04-01

## 2024-04-01 NOTE — TELEPHONE ENCOUNTER
Patient called and stated that he took his last dose of Clarithromycin this am 4-1-24. Patient still having some SOB and coughing up a light yellowish phlegm.  Call patient and advise

## 2024-04-03 NOTE — TELEPHONE ENCOUNTER
Informed pt of Dr Nicholson's instructions/advice.  Pt would like to know how long he should wait before considering the CT or further evaluation?

## 2024-04-10 ENCOUNTER — OFFICE VISIT (OUTPATIENT)
Dept: FAMILY MEDICINE CLINIC | Age: 79
End: 2024-04-10
Payer: MEDICARE

## 2024-04-10 VITALS
HEIGHT: 66 IN | BODY MASS INDEX: 22.82 KG/M2 | OXYGEN SATURATION: 91 % | SYSTOLIC BLOOD PRESSURE: 124 MMHG | DIASTOLIC BLOOD PRESSURE: 84 MMHG | WEIGHT: 142 LBS | HEART RATE: 74 BPM

## 2024-04-10 DIAGNOSIS — J44.9 COPD, SEVERE (HCC): Primary | ICD-10-CM

## 2024-04-10 DIAGNOSIS — R60.9 DEPENDENT EDEMA: ICD-10-CM

## 2024-04-10 DIAGNOSIS — R06.09 DOE (DYSPNEA ON EXERTION): ICD-10-CM

## 2024-04-10 PROCEDURE — 1123F ACP DISCUSS/DSCN MKR DOCD: CPT | Performed by: INTERNAL MEDICINE

## 2024-04-10 PROCEDURE — 99213 OFFICE O/P EST LOW 20 MIN: CPT | Performed by: INTERNAL MEDICINE

## 2024-04-10 RX ORDER — FUROSEMIDE 20 MG/1
20 TABLET ORAL EVERY OTHER DAY
Qty: 15 TABLET | Refills: 5 | Status: SHIPPED | OUTPATIENT
Start: 2024-04-10

## 2024-04-10 RX ORDER — GLUCOSAMINE HCL 500 MG
1000 TABLET ORAL DAILY
COMMUNITY

## 2024-04-10 RX ORDER — CHLORAL HYDRATE 500 MG
1000 CAPSULE ORAL DAILY
COMMUNITY

## 2024-04-10 RX ORDER — CYANOCOBALAMIN (VITAMIN B-12) 500 MCG
1000 TABLET ORAL DAILY
COMMUNITY

## 2024-04-22 ENCOUNTER — OFFICE VISIT (OUTPATIENT)
Dept: FAMILY MEDICINE CLINIC | Age: 79
End: 2024-04-22
Payer: MEDICARE

## 2024-04-22 VITALS
DIASTOLIC BLOOD PRESSURE: 78 MMHG | OXYGEN SATURATION: 90 % | HEIGHT: 66 IN | SYSTOLIC BLOOD PRESSURE: 124 MMHG | HEART RATE: 84 BPM | WEIGHT: 136 LBS | BODY MASS INDEX: 21.86 KG/M2

## 2024-04-22 DIAGNOSIS — J44.9 COPD, SEVERE (HCC): ICD-10-CM

## 2024-04-22 DIAGNOSIS — J90 PLEURAL EFFUSION: ICD-10-CM

## 2024-04-22 DIAGNOSIS — R06.09 DOE (DYSPNEA ON EXERTION): Primary | ICD-10-CM

## 2024-04-22 PROCEDURE — 1123F ACP DISCUSS/DSCN MKR DOCD: CPT | Performed by: INTERNAL MEDICINE

## 2024-04-22 PROCEDURE — 99214 OFFICE O/P EST MOD 30 MIN: CPT | Performed by: INTERNAL MEDICINE

## 2024-04-22 RX ORDER — ROFLUMILAST 250 UG/1
250 TABLET ORAL DAILY
Qty: 30 TABLET | Refills: 3 | Status: SHIPPED | OUTPATIENT
Start: 2024-04-22

## 2024-04-22 NOTE — PROGRESS NOTES
Outpatient Clinic Visit Note    Patient: Kofi Pineda  : 1945 (79 y.o.)  Date: 2024    CC:  Chief Complaint   Patient presents with    Other     4 month follow up    Follow-up     Follow up.  Patient lost 6 lbs since his last visit.  Patient states he is still short of breath        HPI: He had worked with [facility] in the past, but feels that he doesn't explain things very well.      Past Medical History:    Past Medical History:   Diagnosis Date    COPD (chronic obstructive pulmonary disease) (HCC)     Emphysema of lung (HCC)        Past Surgical History:  History reviewed. No pertinent surgical history.    Home Medications:  Current Outpatient Medications   Medication Sig Dispense Refill    Roflumilast (DALIRESP) 250 MCG tablet Take 1 tablet by mouth daily 30 tablet 3    Cyanocobalamin (VITAMIN B 12) 500 MCG TABS Take 1,000 mcg by mouth daily      Cholecalciferol (VITAMIN D3) 75 MCG (3000 UT) TABS Take 1,000 mcg by mouth daily      Omega-3 Fatty Acids (FISH OIL) 1000 MG capsule Take 1 capsule by mouth daily      furosemide (LASIX) 20 MG tablet Take 1 tablet by mouth every other day 15 tablet 5    Multiple Vitamin (MVI, CELEBRATE, CHEWABLE TABLET) Take by mouth      Ascorbic Acid (VITAMIN C) 1000 MG tablet Take 1 tablet by mouth      aspirin 81 MG chewable tablet Take by mouth      dapsone 25 MG tablet       tamsulosin (FLOMAX) 0.4 MG capsule Take 2 capsules by mouth daily 60 capsule 5    BREZTRI AEROSPHERE 160-9-4.8 MCG/ACT AERO INHALE TWO PUFFS BY MOUTH TWICE A DAY 10.7 g 5    triamcinolone (KENALOG) 0.1 % ointment       montelukast (SINGULAIR) 10 MG tablet       albuterol sulfate HFA (PROVENTIL;VENTOLIN;PROAIR) 108 (90 Base) MCG/ACT inhaler Inhale 2 puffs into the lungs every 6 hours as needed for Wheezing       No current facility-administered medications for this visit.        Allergies:    Patient has no known allergies.    Family History:   History reviewed. No pertinent family

## 2024-06-03 ENCOUNTER — OFFICE VISIT (OUTPATIENT)
Dept: FAMILY MEDICINE CLINIC | Age: 79
End: 2024-06-03
Payer: MEDICARE

## 2024-06-03 VITALS
RESPIRATION RATE: 18 BRPM | HEART RATE: 93 BPM | DIASTOLIC BLOOD PRESSURE: 82 MMHG | SYSTOLIC BLOOD PRESSURE: 138 MMHG | WEIGHT: 134.9 LBS | HEIGHT: 66 IN | BODY MASS INDEX: 21.68 KG/M2 | OXYGEN SATURATION: 93 %

## 2024-06-03 DIAGNOSIS — R06.09 DOE (DYSPNEA ON EXERTION): ICD-10-CM

## 2024-06-03 DIAGNOSIS — R60.9 DEPENDENT EDEMA: ICD-10-CM

## 2024-06-03 DIAGNOSIS — R73.9 HYPERGLYCEMIA: ICD-10-CM

## 2024-06-03 DIAGNOSIS — J44.9 COPD, SEVERE (HCC): Primary | ICD-10-CM

## 2024-06-03 DIAGNOSIS — J44.9 COPD, SEVERE (HCC): ICD-10-CM

## 2024-06-03 DIAGNOSIS — L60.2 HYPERTROPHIC TOENAIL: ICD-10-CM

## 2024-06-03 PROCEDURE — 1123F ACP DISCUSS/DSCN MKR DOCD: CPT | Performed by: INTERNAL MEDICINE

## 2024-06-03 PROCEDURE — 99214 OFFICE O/P EST MOD 30 MIN: CPT | Performed by: INTERNAL MEDICINE

## 2024-06-03 RX ORDER — LEVOCETIRIZINE DIHYDROCHLORIDE 5 MG/1
TABLET, FILM COATED ORAL
COMMUNITY
Start: 2024-04-25

## 2024-06-03 NOTE — PROGRESS NOTES
Outpatient Clinic Visit Note    Patient: Kofi Pineda  : 1945 (79 y.o.)  Date: 6/3/2024    CC:  Chief Complaint   Patient presents with    6 weeks    Depression     Some depression since wife passed away. 5 weeks ago today.     Medication Problem     Rofimulast is too expensive.         HPI: he also has a few toenails which are black.  He does remember stubbing his toes a few weeks ago.     Past Medical History:    Past Medical History:   Diagnosis Date    COPD (chronic obstructive pulmonary disease) (HCC)     Emphysema of lung (HCC)        Past Surgical History:  No past surgical history on file.    Home Medications:  Current Outpatient Medications   Medication Sig Dispense Refill    levocetirizine (XYZAL) 5 MG tablet       Roflumilast (DALIRESP) 250 MCG tablet Take 1 tablet by mouth daily 30 tablet 3    Cyanocobalamin (VITAMIN B 12) 500 MCG TABS Take 1,000 mcg by mouth daily      Cholecalciferol (VITAMIN D3) 75 MCG (3000 UT) TABS Take 1,000 mcg by mouth daily      Omega-3 Fatty Acids (FISH OIL) 1000 MG capsule Take 1 capsule by mouth daily      furosemide (LASIX) 20 MG tablet Take 1 tablet by mouth every other day 15 tablet 5    Multiple Vitamin (MVI, CELEBRATE, CHEWABLE TABLET) Take by mouth      Ascorbic Acid (VITAMIN C) 1000 MG tablet Take 1 tablet by mouth      aspirin 81 MG chewable tablet Take by mouth      dapsone 25 MG tablet       tamsulosin (FLOMAX) 0.4 MG capsule Take 2 capsules by mouth daily 60 capsule 5    BREZTRI AEROSPHERE 160-9-4.8 MCG/ACT AERO INHALE TWO PUFFS BY MOUTH TWICE A DAY 10.7 g 5    triamcinolone (KENALOG) 0.1 % ointment       montelukast (SINGULAIR) 10 MG tablet       albuterol sulfate HFA (PROVENTIL;VENTOLIN;PROAIR) 108 (90 Base) MCG/ACT inhaler Inhale 2 puffs into the lungs every 6 hours as needed for Wheezing       No current facility-administered medications for this visit.        Allergies:    Patient has no known allergies.    Family History:   No family history on

## 2024-06-04 LAB
ALBUMIN SERPL-MCNC: 3.5 G/DL (ref 3.4–5)
ALBUMIN/GLOB SERPL: 1.3 {RATIO} (ref 1.1–2.2)
ALP SERPL-CCNC: 92 U/L (ref 40–129)
ALT SERPL-CCNC: 15 U/L (ref 10–40)
ANION GAP SERPL CALCULATED.3IONS-SCNC: 10 MMOL/L (ref 3–16)
AST SERPL-CCNC: 17 U/L (ref 15–37)
BASOPHILS # BLD: 0 K/UL (ref 0–0.2)
BASOPHILS NFR BLD: 0.5 %
BILIRUB SERPL-MCNC: 0.4 MG/DL (ref 0–1)
BUN SERPL-MCNC: 11 MG/DL (ref 7–20)
CALCIUM SERPL-MCNC: 8.6 MG/DL (ref 8.3–10.6)
CHLORIDE SERPL-SCNC: 97 MMOL/L (ref 99–110)
CO2 SERPL-SCNC: 29 MMOL/L (ref 21–32)
CREAT SERPL-MCNC: 0.5 MG/DL (ref 0.8–1.3)
DEPRECATED RDW RBC AUTO: 14.7 % (ref 12.4–15.4)
EOSINOPHIL # BLD: 0 K/UL (ref 0–0.6)
EOSINOPHIL NFR BLD: 0.4 %
EST. AVERAGE GLUCOSE BLD GHB EST-MCNC: 93.9 MG/DL
GFR SERPLBLD CREATININE-BSD FMLA CKD-EPI: >90 ML/MIN/{1.73_M2}
GLUCOSE SERPL-MCNC: 101 MG/DL (ref 70–99)
HBA1C MFR BLD: 4.9 %
HCT VFR BLD AUTO: 44.5 % (ref 40.5–52.5)
HGB BLD-MCNC: 14.7 G/DL (ref 13.5–17.5)
LYMPHOCYTES # BLD: 0.8 K/UL (ref 1–5.1)
LYMPHOCYTES NFR BLD: 14.2 %
MCH RBC QN AUTO: 32.7 PG (ref 26–34)
MCHC RBC AUTO-ENTMCNC: 33 G/DL (ref 31–36)
MCV RBC AUTO: 99 FL (ref 80–100)
MONOCYTES # BLD: 0.4 K/UL (ref 0–1.3)
MONOCYTES NFR BLD: 7.9 %
NEUTROPHILS # BLD: 4.2 K/UL (ref 1.7–7.7)
NEUTROPHILS NFR BLD: 77 %
PLATELET # BLD AUTO: 261 K/UL (ref 135–450)
PMV BLD AUTO: 7.1 FL (ref 5–10.5)
POTASSIUM SERPL-SCNC: 4.5 MMOL/L (ref 3.5–5.1)
PROT SERPL-MCNC: 6.1 G/DL (ref 6.4–8.2)
RBC # BLD AUTO: 4.49 M/UL (ref 4.2–5.9)
SODIUM SERPL-SCNC: 136 MMOL/L (ref 136–145)
WBC # BLD AUTO: 5.4 K/UL (ref 4–11)

## 2024-06-06 ENCOUNTER — TELEPHONE (OUTPATIENT)
Dept: FAMILY MEDICINE CLINIC | Age: 79
End: 2024-06-06

## 2024-06-06 NOTE — TELEPHONE ENCOUNTER
To Dr. Nicholson--    [facility] sent a Nurse Practioner to his house----he was tested for PAD----this test showed he does have Peripheral Arterial Disease in his one leg.    Did you get a copy of this test from [facility]?    Please let him know if you want him to come in for an appt to discuss this situation.

## 2024-06-10 ENCOUNTER — OFFICE VISIT (OUTPATIENT)
Dept: FAMILY MEDICINE CLINIC | Age: 79
End: 2024-06-10
Payer: MEDICARE

## 2024-06-10 VITALS
HEIGHT: 66 IN | DIASTOLIC BLOOD PRESSURE: 80 MMHG | BODY MASS INDEX: 21.86 KG/M2 | OXYGEN SATURATION: 90 % | SYSTOLIC BLOOD PRESSURE: 130 MMHG | HEART RATE: 78 BPM | WEIGHT: 136 LBS

## 2024-06-10 DIAGNOSIS — I73.9 PVD (PERIPHERAL VASCULAR DISEASE) (HCC): Primary | ICD-10-CM

## 2024-06-10 PROCEDURE — 1123F ACP DISCUSS/DSCN MKR DOCD: CPT | Performed by: INTERNAL MEDICINE

## 2024-06-10 PROCEDURE — 99213 OFFICE O/P EST LOW 20 MIN: CPT | Performed by: INTERNAL MEDICINE

## 2024-06-10 NOTE — PROGRESS NOTES
Outpatient Clinic Visit Note    Patient: Kofi Pineda  : 1945 (79 y.o.)  Date: 6/10/2024    CC:  Chief Complaint   Patient presents with    Follow-up     Follow up on PAD        HPI: his insurance company did a home screening and found that he has an abnormal screening DANUTA.   He already follows with Dr Michael Brumfield of cardiology.  Recent labs with CMP, cbc, A1c were very good.  He denies any leg cramping.     Past Medical History:    Past Medical History:   Diagnosis Date    COPD (chronic obstructive pulmonary disease) (HCC)     Emphysema of lung (HCC)        Past Surgical History:  No past surgical history on file.    Home Medications:  Current Outpatient Medications   Medication Sig Dispense Refill    levocetirizine (XYZAL) 5 MG tablet       Roflumilast (DALIRESP) 250 MCG tablet Take 1 tablet by mouth daily 30 tablet 3    Cyanocobalamin (VITAMIN B 12) 500 MCG TABS Take 1,000 mcg by mouth daily      Cholecalciferol (VITAMIN D3) 75 MCG (3000 UT) TABS Take 1,000 mcg by mouth daily      Omega-3 Fatty Acids (FISH OIL) 1000 MG capsule Take 1 capsule by mouth daily      furosemide (LASIX) 20 MG tablet Take 1 tablet by mouth every other day 15 tablet 5    Multiple Vitamin (MVI, CELEBRATE, CHEWABLE TABLET) Take by mouth      Ascorbic Acid (VITAMIN C) 1000 MG tablet Take 1 tablet by mouth      aspirin 81 MG chewable tablet Take by mouth      dapsone 25 MG tablet       tamsulosin (FLOMAX) 0.4 MG capsule Take 2 capsules by mouth daily 60 capsule 5    BREZTRI AEROSPHERE 160-9-4.8 MCG/ACT AERO INHALE TWO PUFFS BY MOUTH TWICE A DAY 10.7 g 5    triamcinolone (KENALOG) 0.1 % ointment       montelukast (SINGULAIR) 10 MG tablet       albuterol sulfate HFA (PROVENTIL;VENTOLIN;PROAIR) 108 (90 Base) MCG/ACT inhaler Inhale 2 puffs into the lungs every 6 hours as needed for Wheezing       No current facility-administered medications for this visit.        Allergies:    Patient has no known allergies.    Family History:   No  Visit Information Date & Time Provider Department Dept. Phone Encounter #  
 1/16/2017  1:00 PM Sue Grant MD [facility] [phone] [id number] Follow-up Instructions Return in about 3 months (around 4/16/2017) for asthma. Upcoming Health Maintenance Date Due Hepatitis A Peds Age 1-18 (1 of 2 - Standard Series) 10/31/2003 MCV through Age 25 (2 of 2) 10/31/2018 DTaP/Tdap/Td series (7 - Td) 4/11/2024 Allergies as of 1/16/2017  Review Complete On: 1/16/2017 By: Sue Grant MD  
  
 Severity Noted Reaction Type Reactions Rocephin [Ceftriaxone] High 10/04/2013   Systemic Hives, Shortness of Breath, Swelling Current Immunizations  Never Reviewed Name Date DTaP 3/23/2007, 12/1/2003, 5/1/2003, 3/3/2003, 1/3/2003 HPV (Quad) 2/13/2015  1:40 PM, 8/8/2014, 4/11/2014  2:27 PM  
 Hep B Vaccine 5/1/2003, 2002, 2002 Hib 1/5/2004, 5/1/2003, 3/3/2003, 1/3/2003 Influenza Vaccine 10/19/2012, 12/15/2011, 1/19/2011, 11/21/2008, 1/10/2006, 11/11/2005 Influenza Vaccine (Quad) PF 10/4/2016 MMR 3/23/2007, 12/1/2003 Meningococcal (MCV4P) Vaccine 4/11/2014  2:27 PM  
 Pneumococcal Vaccine (Unspecified Type) 1/19/2004, 5/1/2003, 3/3/2003, 1/3/2003 Poliovirus vaccine 3/23/2007, 12/1/2003, 3/3/2003, 1/3/2003 Tdap 4/11/2014  2:28 PM  
 Varicella Virus Vaccine 3/23/2007, 12/1/2003 Not reviewed this visit You Were Diagnosed With   
  
 Codes Comments Moderate persistent asthma without complication    -  Primary ICD-10-CM: J45.40 ICD-9-CM: 493.90 Vitals BP Pulse Temp Resp Height(growth percentile) Weight(growth percentile) 113/77 (51 %/ 82 %)* 95 97.5 °F (36.4 °C) (Oral) 16 5' 7.52\" (1.715 m) (95 %, Z= 1.63) 113 lb 3.2 oz (51.3 kg) (56 %, Z= 0.14) LMP BMI OB Status Smoking Status 01/15/2017 (Exact Date) 17.46 kg/m2 (21 %, Z= -0.80) Having regular periods Never Smoker *BP percentiles are based on NHBPEP's 4th Report Growth percentiles are based on CDC 2-20 Years data. BMI and BSA Data Body Mass Index Body Surface Area  
 17.46 kg/m 2 1.56 m 2 Preferred Pharmacy Pharmacy Name Phone [phone], [place] [phone] Your Updated Medication List  
  
   
This list is accurate as of: 1/16/17  1:41 PM.  Always use your most recent med list.  
  
  
  
  
 * albuterol 90 mcg/actuation inhaler Commonly known as:  VENTOLIN HFA Inhale 2 puffs as directed every 4 hours as needed for coughing or wheezing. Use with spacer * albuterol 2.5 mg /3 mL (0.083 %) nebulizer solution Commonly known as:  PROVENTIL VENTOLIN Take 3 mL by inhalation every four (4) hours as needed for Wheezing for up to 30 days. clindamycin 1 % external solution Commonly known as:  CLEOCIN T  
use thin film on affected area  
  
 fluticasone-salmeterol 115-21 mcg/actuation inhaler Commonly known as:  ADVAIR HFA INHALE 2 PUFFS BY MOUTH 2 TIMES A DAY  
  
 inhalational spacing device 1 Each by Does Not Apply route as needed. loratadine 10 mg tablet Commonly known as:  Trav Wendy Take 1 Tab by mouth daily. montelukast 5 mg chewable tablet Commonly known as:  SINGULAIR Take 1 Tab by mouth nightly. predniSONE 20 mg tablet Commonly known as:  DELTASONE  
2 po bid for 3d then daily for 3d * Notice: This list has 2 medication(s) that are the same as other medications prescribed for you. Read the directions carefully, and ask your doctor or other care provider to review them with you. Follow-up Instructions Return in about 3 months (around 4/16/2017) for asthma. Patient Instructions [employer] Activation Thank you for requesting access to [employer]. Please follow the instructions below to securely access and download your online medical record.  [employer] allows you to send messages to your doctor, view your test results, renew your prescriptions, schedule appointments, and more. How Do I Sign Up? 1. In your internet browser, go to [email or web address] 
2. Click on the First Time User? Click Here link in the Sign In box. You will be redirect to the New Member Sign Up page. 3. Enter your [employer] Access Code exactly as it appears below. You will not need to use this code after youve completed the sign-up process. If you do not sign up before the expiration date, you must request a new code. [employer] Access Code: Activation code not generated Patient is below the minimum allowed age for [employer] access. (This is the date your MyChart access code will ) 4. Enter the last four digits of your Social Security Number (xxxx) and Date of Birth (mm/dd/yyyy) as indicated and click Submit. You will be taken to the next sign-up page. 5. Create a [employer] ID. This will be your [employer] login ID and cannot be changed, so think of one that is secure and easy to remember. 6. Create a [employer] password. You can change your password at any time. 7. Enter your Password Reset Question and Answer. This can be used at a later time if you forget your password. 8. Enter your e-mail address. You will receive e-mail notification when new information is available in [address] 9. Click Sign Up. You can now view and download portions of your medical record. 10. Click the Download Summary menu link to download a portable copy of your medical information. Additional Information If you have questions, please visit the Frequently Asked Questions section of the [employer] website at [email or web address] Remember, [employer] is NOT to be used for urgent needs. For medical emergencies, dial 911. Asthma in Children 12 Years and Older: Care Instructions Your Care Instructions Asthma makes it hard for your child to breathe.  During an asthma attack, the airways swell and narrow. Severe asthma attacks can be life-threatening, but you can usually prevent them. Controlling asthma and treating symptoms before they get bad can help your child avoid bad attacks. You may also avoid future trips to the doctor. Follow-up care is a key part of your child's treatment and safety. Be sure to make and go to all appointments, and call your doctor if your child is having problems. It's also a good idea to know your child's test results and keep a list of the medicines your child takes. How can you care for your child at home? Action plan · Make and follow an asthma action plan. It lists the medicines your child takes every day and will show you what to do if your child has an attack. · Work with a doctor to make a plan if your child does not have one. It's important that your child take part in writing his or her plan. · Tell adults at school that your child has asthma. Give them a copy of the action plan. They can help during an attack. Medicines · Your child may take an inhaled corticosteroid every day. It keeps the airways from swelling. Do not use this daily medicine to treat an attack. It does not work fast enough. · Your child will take quick-relief medicine for an asthma attack. This is usually inhaled albuterol. It relaxes the airways to help your child breathe. · If your doctor prescribed corticosteroid pills for your child to use during an attack, give them to your child as directed. They may take hours to work, but they may shorten the attack and help your child breathe better. Check your child's breathing · Check your child for asthma symptoms to know which step to follow in your child's action plan. Watch for things like being short of breath, having chest tightness, coughing, and wheezing. Also notice if symptoms wake your child up at night or if he or she gets tired quickly during exercise. · If your child has a peak flow meter, use it to check how well your child is breathing. This can help you predict when an asthma attack is going to occur. Then your child can take medicine to prevent the asthma attack or make it less severe. Keep your child away from triggers · Try to learn what triggers your child's asthma attacks, and avoid the triggers when you can. Common triggers include colds, smoke, air pollution, pollen, mold, pets, cockroaches, stress, and cold air. · If tests show that dust is a trigger for your child's asthma, try to control house dust. 
· Talk to your child's doctor about whether to have your child tested for allergies. Other care · Have your child drink plenty of fluids. · Encourage your child to be physically active, including playing on sports teams. If needed, using medicine right before exercise usually prevents problems. · Have your child get an annual flu vaccine. When should you call for help? Call 911 anytime you think your child may need emergency care. For example, call if: 
· Your child has severe trouble breathing. Call your doctor now or seek immediate medical care if: 
· Your child has an asthma attack and does not get better after you use the action plan. · Your child coughs up yellow, dark brown, or bloody mucus (sputum). Watch closely for changes in your child's health, and be sure to contact your doctor if: 
· Your child's wheezing and coughing get worse. · Your child needs quick-relief medicine on more than 2 days a week (unless it is just for exercise). · Your child has any new symptoms, such as a fever. Where can you learn more? Go to http://stephanie-ki.info/. Enter M859 in the search box to learn more about \"Asthma in Children 12 Years and Older: Care Instructions. \" Current as of: May 23, 2016 Content Version: 11.1 © 3212-7599 [employer].  Care instructions adapted under license by [address] (which disclaims liability or warranty for this information). If you have questions about a medical condition or this instruction, always ask your healthcare professional. [address] any warranty or liability for your use of this information. [employer] Activation Thank you for requesting access to [employer]. Please follow the instructions below to securely access and download your online medical record. [employer] allows you to send messages to your doctor, view your test results, renew your prescriptions, schedule appointments, and more. How Do I Sign Up? 
 
11. In your internet browser, go to [email or web address] 
12. Click on the First Time User? Click Here link in the Sign In box. You will be redirect to the New Member Sign Up page. 15. Enter your [employer] Access Code exactly as it appears below. You will not need to use this code after youve completed the sign-up process. If you do not sign up before the expiration date, you must request a new code. [employer] Access Code: Activation code not generated Patient is below the minimum allowed age for [employer] access. (This is the date your [employer] access code will ) 14. Enter the last four digits of your Social Security Number (xxxx) and Date of Birth (mm/dd/yyyy) as indicated and click Submit. You will be taken to the next sign-up page. 15. Create a [employer] ID. This will be your [employer] login ID and cannot be changed, so think of one that is secure and easy to remember. 12. Create a [employer] password. You can change your password at any time. 16. Enter your Password Reset Question and Answer. This can be used at a later time if you forget your password. 25. Enter your e-mail address. You will receive e-mail notification when new information is available in [address] 19. Click Sign Up. You can now view and download portions of your medical record. 20. Click the Download Summary menu link to download a portable copy of your medical information. Additional Information If you have questions, please visit the Frequently Asked Questions section of the [email or web address] website at [email or web address] Remember, [employer] is NOT to be used for urgent needs. For medical emergencies, dial 911. Introducing [facility] & HEALTH SERVICES! Dear Parent or Guardian, Thank you for requesting a just.me account for your child. With just.me, you can view your childs hospital or ER discharge instructions, current allergies, immunizations and much more. In order to access your childs information, we require a signed consent on file. Please see the [facility] department or call [phone] for instructions on completing a just.me Proxy request.   
Additional Information If you have questions, please visit the Frequently Asked Questions section of the [email or web address] website at [email or web address] Remember, [email or web address] is NOT to be used for urgent needs. For medical emergencies, dial 911. Now available from your iPhone and Android! Please provide this summary of care documentation to your next provider. Your primary care clinician is listed as Ilene Concepcion. If you have any questions after today's visit, please call [phone].

## 2024-06-26 ENCOUNTER — TELEPHONE (OUTPATIENT)
Dept: FAMILY MEDICINE CLINIC | Age: 79
End: 2024-06-26

## 2024-06-26 RX ORDER — ROFLUMILAST 500 UG/1
250 TABLET ORAL DAILY
Qty: 45 TABLET | Refills: 1 | Status: SHIPPED | OUTPATIENT
Start: 2024-06-26

## 2024-06-26 RX ORDER — ROFLUMILAST 500 UG/1
250 TABLET ORAL DAILY
COMMUNITY
End: 2024-06-26 | Stop reason: SDUPTHER

## 2024-06-26 NOTE — TELEPHONE ENCOUNTER
ROFLUMILAST-PT HAS BEEN OUT-PLEASE CALL ONCE YOU SEND THIS-IT IS CHEAPER TO GET THIS THERE THAN AT HIS OWN PHARM. ALSO THEY ONLY HAVE 500 MG -PT STATES HE WILL SPLIT THE PILL

## 2024-07-02 ENCOUNTER — TELEPHONE (OUTPATIENT)
Dept: FAMILY MEDICINE CLINIC | Age: 79
End: 2024-07-02

## 2024-07-02 NOTE — TELEPHONE ENCOUNTER
To -    Re:    Roflumilast (DALIRESP) 500 MCG tablet     Patient said these pills are too hard to cut---can he take on every other day?    Please advise.

## 2024-10-02 ENCOUNTER — OFFICE VISIT (OUTPATIENT)
Dept: FAMILY MEDICINE CLINIC | Age: 79
End: 2024-10-02
Payer: MEDICARE

## 2024-10-02 VITALS
BODY MASS INDEX: 20.89 KG/M2 | HEART RATE: 80 BPM | SYSTOLIC BLOOD PRESSURE: 110 MMHG | DIASTOLIC BLOOD PRESSURE: 66 MMHG | HEIGHT: 66 IN | WEIGHT: 130 LBS | OXYGEN SATURATION: 94 %

## 2024-10-02 DIAGNOSIS — J44.9 COPD, SEVERE (HCC): ICD-10-CM

## 2024-10-02 DIAGNOSIS — I27.21 PULMONARY ARTERIAL HYPERTENSION (HCC): ICD-10-CM

## 2024-10-02 DIAGNOSIS — I73.9 PVD (PERIPHERAL VASCULAR DISEASE) (HCC): ICD-10-CM

## 2024-10-02 DIAGNOSIS — R60.9 DEPENDENT EDEMA: Primary | ICD-10-CM

## 2024-10-02 DIAGNOSIS — R06.09 DOE (DYSPNEA ON EXERTION): ICD-10-CM

## 2024-10-02 PROCEDURE — 99214 OFFICE O/P EST MOD 30 MIN: CPT | Performed by: INTERNAL MEDICINE

## 2024-10-02 PROCEDURE — 1123F ACP DISCUSS/DSCN MKR DOCD: CPT | Performed by: INTERNAL MEDICINE

## 2024-10-02 PROCEDURE — 90653 IIV ADJUVANT VACCINE IM: CPT | Performed by: INTERNAL MEDICINE

## 2024-10-02 PROCEDURE — G0008 ADMIN INFLUENZA VIRUS VAC: HCPCS | Performed by: INTERNAL MEDICINE

## 2024-10-02 RX ORDER — ROFLUMILAST 500 UG/1
250 TABLET ORAL DAILY
Qty: 45 TABLET | Refills: 1 | Status: SHIPPED | OUTPATIENT
Start: 2024-10-02

## 2024-10-02 RX ORDER — ALBUTEROL SULFATE 90 UG/1
2 INHALANT RESPIRATORY (INHALATION) EVERY 6 HOURS PRN
Qty: 18 G | Refills: 3 | Status: SHIPPED | OUTPATIENT
Start: 2024-10-02

## 2024-10-02 RX ORDER — TAMSULOSIN HYDROCHLORIDE 0.4 MG/1
0.8 CAPSULE ORAL DAILY
Qty: 60 CAPSULE | Refills: 5 | Status: SHIPPED | OUTPATIENT
Start: 2024-10-02 | End: 2025-03-31

## 2024-10-02 NOTE — PROGRESS NOTES
History:   History reviewed. No pertinent family history.     ROS: A 10-organ Review Of Systems was obtained and otherwise unremarkable except as per HPI.    Data: Old records have been reviewed electronically.    PHYSICAL EXAM:  /66 (Site: Left Upper Arm, Position: Sitting, Cuff Size: Medium Adult)   Pulse 80   Ht 1.676 m (5' 6\")   Wt 59 kg (130 lb)   SpO2 94%   BMI 20.98 kg/m²     Constitutional: He  is oriented to person, place, and time. He appears well-developed and well-nourished.   HENT:   Head: Normocephalic and atraumatic.   Eyes: Pupils are equal, round, and reactive to light. Conjunctivae and EOM are normal.   Neck: Normal range of motion. Neck supple. No JVD present.     Cardiovascular: Normal rate, regular rhythm, normal heart sounds and intact distal pulses.  Trace ankle edema.   2/6 YESSENIA to mitral area.      Pulmonary/Chest: Effort normal and breath sounds normal.   Abdominal: Soft. Bowel sounds are normal. He exhibits no distension. There is no tenderness.   Musculoskeletal: Normal range of motion.   Neurological: He is alert and oriented to person, place, and time.   Skin: Skin is warm and dry. Capillary refill takes less than 2 seconds.   Psychiatric: He has a normal mood and affect.  His behavior is normal. Judgment and thought content normal.     Assessment & Plan     Diagnosis Orders   1. Dependent edema        2. RICK (dyspnea on exertion)        3. PVD (peripheral vascular disease) (HCC)        4. Pulmonary arterial hypertension (HCC)        5. COPD, severe (HCC)            He is doing OK with current meds.  Cardio is following.    Will order flu shot in e office.   Will reduce his diuretic to qod as he is pedal edema is resolved.     Return in about 4 months (around 2/2/2025).     Charles Nicholson MD, 10/2/2024 2:16 PM

## 2025-02-05 ENCOUNTER — OFFICE VISIT (OUTPATIENT)
Dept: FAMILY MEDICINE CLINIC | Age: 80
End: 2025-02-05
Payer: MEDICARE

## 2025-02-05 VITALS
DIASTOLIC BLOOD PRESSURE: 64 MMHG | OXYGEN SATURATION: 94 % | BODY MASS INDEX: 22.5 KG/M2 | HEIGHT: 66 IN | WEIGHT: 140 LBS | SYSTOLIC BLOOD PRESSURE: 110 MMHG | HEART RATE: 69 BPM

## 2025-02-05 DIAGNOSIS — I27.21 PULMONARY ARTERIAL HYPERTENSION (HCC): ICD-10-CM

## 2025-02-05 DIAGNOSIS — R60.9 DEPENDENT EDEMA: ICD-10-CM

## 2025-02-05 DIAGNOSIS — J44.9 COPD, SEVERE (HCC): ICD-10-CM

## 2025-02-05 DIAGNOSIS — I27.21 PULMONARY ARTERIAL HYPERTENSION (HCC): Primary | ICD-10-CM

## 2025-02-05 DIAGNOSIS — N40.0 BENIGN PROSTATIC HYPERPLASIA, UNSPECIFIED WHETHER LOWER URINARY TRACT SYMPTOMS PRESENT: ICD-10-CM

## 2025-02-05 PROCEDURE — 99213 OFFICE O/P EST LOW 20 MIN: CPT | Performed by: INTERNAL MEDICINE

## 2025-02-05 PROCEDURE — 1160F RVW MEDS BY RX/DR IN RCRD: CPT | Performed by: INTERNAL MEDICINE

## 2025-02-05 PROCEDURE — 1159F MED LIST DOCD IN RCRD: CPT | Performed by: INTERNAL MEDICINE

## 2025-02-05 PROCEDURE — 1123F ACP DISCUSS/DSCN MKR DOCD: CPT | Performed by: INTERNAL MEDICINE

## 2025-02-05 RX ORDER — DORZOLAMIDE HYDROCHLORIDE AND TIMOLOL MALEATE 20; 5 MG/ML; MG/ML
SOLUTION/ DROPS OPHTHALMIC
Status: CANCELLED | OUTPATIENT
Start: 2025-02-05

## 2025-02-05 RX ORDER — DORZOLAMIDE HYDROCHLORIDE AND TIMOLOL MALEATE 20; 5 MG/ML; MG/ML
SOLUTION/ DROPS OPHTHALMIC
COMMUNITY
Start: 2025-01-23

## 2025-02-05 RX ORDER — ATROPINE SULFATE 10 MG/ML
SOLUTION/ DROPS OPHTHALMIC
COMMUNITY
Start: 2024-10-16

## 2025-02-05 SDOH — ECONOMIC STABILITY: FOOD INSECURITY: WITHIN THE PAST 12 MONTHS, YOU WORRIED THAT YOUR FOOD WOULD RUN OUT BEFORE YOU GOT MONEY TO BUY MORE.: NEVER TRUE

## 2025-02-05 SDOH — ECONOMIC STABILITY: FOOD INSECURITY: WITHIN THE PAST 12 MONTHS, THE FOOD YOU BOUGHT JUST DIDN'T LAST AND YOU DIDN'T HAVE MONEY TO GET MORE.: NEVER TRUE

## 2025-02-05 ASSESSMENT — PATIENT HEALTH QUESTIONNAIRE - PHQ9
SUM OF ALL RESPONSES TO PHQ QUESTIONS 1-9: 2
SUM OF ALL RESPONSES TO PHQ QUESTIONS 1-9: 2
1. LITTLE INTEREST OR PLEASURE IN DOING THINGS: SEVERAL DAYS
SUM OF ALL RESPONSES TO PHQ QUESTIONS 1-9: 2
SUM OF ALL RESPONSES TO PHQ9 QUESTIONS 1 & 2: 2
SUM OF ALL RESPONSES TO PHQ QUESTIONS 1-9: 2
2. FEELING DOWN, DEPRESSED OR HOPELESS: SEVERAL DAYS

## 2025-02-05 NOTE — PROGRESS NOTES
Outpatient Clinic Visit Note    Patient: Kofi Pineda  : 1945 (80 y.o.)  Date: 2025    CC:  Chief Complaint   Patient presents with    Follow-up     4 month follow up  HCC  Pt stated that he having coughing spells  Pt stated that he has a couple of procedures coming up, and heart test coming up.        HPI: he is due for a BALBIR. He has a PFO, and gets short of breath more easily than before.     Past Medical History:    Past Medical History:   Diagnosis Date    COPD (chronic obstructive pulmonary disease) (Formerly [facility])     Emphysema of lung (Formerly [facility])        Past Surgical History:  History reviewed. No pertinent surgical history.    Home Medications:  Current Outpatient Medications   Medication Sig Dispense Refill    atropine 1 % ophthalmic solution       dorzolamide-timolol (COSOPT) 2-0.5 % ophthalmic solution       albuterol sulfate HFA (PROVENTIL;VENTOLIN;PROAIR) 108 (90 Base) MCG/ACT inhaler Inhale 2 puffs into the lungs every 6 hours as needed for Wheezing 18 g 3    tamsulosin (FLOMAX) 0.4 MG capsule Take 2 capsules by mouth daily 60 capsule 5    Roflumilast (DALIRESP) 500 MCG tablet Take 0.5 tablets by mouth daily 1/2 qd 45 tablet 1    levocetirizine (XYZAL) 5 MG tablet       Cyanocobalamin (VITAMIN B 12) 500 MCG TABS Take 1,000 mcg by mouth daily      Cholecalciferol (VITAMIN D3) 75 MCG (3000 UT) TABS Take 1,000 mcg by mouth daily      Omega-3 Fatty Acids (FISH OIL) 1000 MG capsule Take 1 capsule by mouth daily      furosemide (LASIX) 20 MG tablet Take 1 tablet by mouth every other day 15 tablet 5    Multiple Vitamin (MVI, CELEBRATE, CHEWABLE TABLET) Take by mouth      Ascorbic Acid (VITAMIN C) 1000 MG tablet Take 1 tablet by mouth      aspirin 81 MG chewable tablet Take by mouth      dapsone 25 MG tablet       BREZTRI AEROSPHERE 160-9-4.8 MCG/ACT AERO INHALE TWO PUFFS BY MOUTH TWICE A DAY 10.7 g 5    montelukast (SINGULAIR) 10 MG tablet        No current facility-administered medications for this visit.

## 2025-02-06 LAB
ALBUMIN SERPL-MCNC: 3.7 G/DL (ref 3.4–5)
ALBUMIN/GLOB SERPL: 1.4 {RATIO} (ref 1.1–2.2)
ALP SERPL-CCNC: 106 U/L (ref 40–129)
ALT SERPL-CCNC: 21 U/L (ref 10–40)
ANION GAP SERPL CALCULATED.3IONS-SCNC: 7 MMOL/L (ref 3–16)
AST SERPL-CCNC: 23 U/L (ref 15–37)
BASOPHILS # BLD: 0 K/UL (ref 0–0.2)
BASOPHILS NFR BLD: 0.6 %
BILIRUB SERPL-MCNC: 0.3 MG/DL (ref 0–1)
BUN SERPL-MCNC: 20 MG/DL (ref 7–20)
CALCIUM SERPL-MCNC: 8.8 MG/DL (ref 8.3–10.6)
CHLORIDE SERPL-SCNC: 101 MMOL/L (ref 99–110)
CO2 SERPL-SCNC: 32 MMOL/L (ref 21–32)
CREAT SERPL-MCNC: 0.7 MG/DL (ref 0.8–1.3)
DEPRECATED RDW RBC AUTO: 15.1 % (ref 12.4–15.4)
EOSINOPHIL # BLD: 0.1 K/UL (ref 0–0.6)
EOSINOPHIL NFR BLD: 1 %
GFR SERPLBLD CREATININE-BSD FMLA CKD-EPI: >90 ML/MIN/{1.73_M2}
GLUCOSE SERPL-MCNC: 70 MG/DL (ref 70–99)
HCT VFR BLD AUTO: 41.5 % (ref 40.5–52.5)
HGB BLD-MCNC: 13.4 G/DL (ref 13.5–17.5)
LYMPHOCYTES # BLD: 0.8 K/UL (ref 1–5.1)
LYMPHOCYTES NFR BLD: 12.8 %
MCH RBC QN AUTO: 32.3 PG (ref 26–34)
MCHC RBC AUTO-ENTMCNC: 32.4 G/DL (ref 31–36)
MCV RBC AUTO: 99.6 FL (ref 80–100)
MONOCYTES # BLD: 0.6 K/UL (ref 0–1.3)
MONOCYTES NFR BLD: 10 %
NEUTROPHILS # BLD: 4.6 K/UL (ref 1.7–7.7)
NEUTROPHILS NFR BLD: 75.6 %
PLATELET # BLD AUTO: 263 K/UL (ref 135–450)
PMV BLD AUTO: 7.1 FL (ref 5–10.5)
POTASSIUM SERPL-SCNC: 4.3 MMOL/L (ref 3.5–5.1)
PROT SERPL-MCNC: 6.4 G/DL (ref 6.4–8.2)
RBC # BLD AUTO: 4.16 M/UL (ref 4.2–5.9)
SODIUM SERPL-SCNC: 140 MMOL/L (ref 136–145)
WBC # BLD AUTO: 6.1 K/UL (ref 4–11)

## 2025-04-03 RX ORDER — ROFLUMILAST 500 UG/1
250 TABLET ORAL DAILY
Qty: 45 TABLET | Refills: 1 | Status: SHIPPED | OUTPATIENT
Start: 2025-04-03

## 2025-04-24 ENCOUNTER — HOSPITAL ENCOUNTER (OUTPATIENT)
Dept: GENERAL RADIOLOGY | Age: 80
Discharge: HOME | End: 2025-04-24
Payer: MEDICARE

## 2025-04-24 DIAGNOSIS — R06.02 SOB (SHORTNESS OF BREATH): ICD-10-CM

## 2025-04-24 PROCEDURE — 71046 X-RAY EXAM CHEST 2 VIEWS: CPT

## 2025-05-16 RX ORDER — TAMSULOSIN HYDROCHLORIDE 0.4 MG/1
0.8 CAPSULE ORAL DAILY
Qty: 60 CAPSULE | Refills: 5 | Status: SHIPPED | OUTPATIENT
Start: 2025-05-16 | End: 2025-11-12

## 2025-05-19 ENCOUNTER — HOSPITAL ENCOUNTER (OUTPATIENT)
Dept: CT IMAGING | Age: 80
Discharge: HOME | End: 2025-05-19
Attending: INTERNAL MEDICINE
Payer: MEDICARE

## 2025-05-19 DIAGNOSIS — R91.8 LUNG INFILTRATE: ICD-10-CM

## 2025-05-19 PROCEDURE — 71250 CT THORAX DX C-: CPT

## 2025-06-13 ENCOUNTER — HOSPITAL ENCOUNTER (OUTPATIENT)
Dept: GENERAL RADIOLOGY | Age: 80
Discharge: HOME | End: 2025-06-13
Payer: MEDICARE

## 2025-06-13 DIAGNOSIS — J18.9 PNEUMONIA OF RIGHT LOWER LOBE DUE TO INFECTIOUS ORGANISM: ICD-10-CM

## 2025-06-13 PROCEDURE — 71046 X-RAY EXAM CHEST 2 VIEWS: CPT

## 2025-06-19 NOTE — PROGRESS NOTES
LEFT MESSAGE TO CALL PAT FOR ASSESSMENT/INSTRUCTIONS FOR PROCEDURE ON 6/27/25 AT [facility], ADVISED WILL NEED BLOOD WORK BY THE DAY PRIOR .

## 2025-06-20 RX ORDER — DEXTROSE MONOHYDRATE AND SODIUM CHLORIDE 5; .45 G/100ML; G/100ML
INJECTION, SOLUTION INTRAVENOUS CONTINUOUS
Status: CANCELLED | OUTPATIENT
Start: 2025-06-27

## 2025-06-20 NOTE — PROGRESS NOTES
.Surgery @ [facility] on 6/27/25 at 1000, arrival 0830    NOTHING TO EAT OR DRINK AFTER MIDNIGHT DAY OF SURGERY    1. Enter thru the hospital main entrance on day of surgery, check in at the Information Desk. If you arrive prior to 6:00am, enter thru the ER entrance.    2. Follow the directions as prescribed by the doctor for your procedure and medications.         Morning of surgery take:  No medications         Stop vitamins, supplements and NSAIDS:      3. Check with your Doctor regarding stopping blood thinners and follow their instructions.    4. Do not smoke, vape or use chewing tobacco morning of surgery. Do not drink any alcoholic beverages 24 hours prior to surgery.       This includes NA Beer. No street drugs 7 days prior to surgery.    5. If you have dentures, contacts of glasses they will be removed before going to the OR; please bring a case.    6. Please bring picture ID, insurance card, paperwork from the doctor’s office (H & P, Consent, & card for implantable devices).    7. Take a shower with an antibacterial soap the night before surgery and the morning of surgery. Do not put anything on your skin      After your morning shower.    8. You will need a responsible adult to drive you home and check on you after surgery.

## 2025-06-26 ENCOUNTER — HOSPITAL ENCOUNTER (OUTPATIENT)
Age: 80
Discharge: HOME | End: 2025-06-26
Payer: MEDICARE

## 2025-06-26 ENCOUNTER — ANESTHESIA EVENT (OUTPATIENT)
Dept: ENDOSCOPY | Age: 80
End: 2025-06-26
Payer: MEDICARE

## 2025-06-26 DIAGNOSIS — Z01.818 PREOP TESTING: ICD-10-CM

## 2025-06-26 LAB
ANION GAP SERPL CALCULATED.3IONS-SCNC: 11 MMOL/L (ref 9–17)
BASOPHILS # BLD: 0.01 K/UL
BASOPHILS NFR BLD: 0 % (ref 0–1)
BUN SERPL-MCNC: 13 MG/DL (ref 7–20)
CALCIUM SERPL-MCNC: 8.9 MG/DL (ref 8.3–10.6)
CHLORIDE SERPL-SCNC: 97 MMOL/L (ref 99–110)
CO2 SERPL-SCNC: 30 MMOL/L (ref 21–32)
CREAT SERPL-MCNC: 0.6 MG/DL (ref 0.8–1.3)
EOSINOPHIL # BLD: 0.04 K/UL
EOSINOPHILS RELATIVE PERCENT: 1 % (ref 0–3)
ERYTHROCYTE [DISTWIDTH] IN BLOOD BY AUTOMATED COUNT: 13.5 % (ref 11.7–14.9)
GFR, ESTIMATED: >90 ML/MIN/1.73M2
GLUCOSE SERPL-MCNC: 123 MG/DL (ref 74–99)
HCT VFR BLD AUTO: 45 % (ref 42–52)
HGB BLD-MCNC: 14 G/DL (ref 13.5–18)
IMM GRANULOCYTES # BLD AUTO: 0.02 K/UL
IMM GRANULOCYTES NFR BLD: 0 %
INR PPP: 1.2
LYMPHOCYTES NFR BLD: 0.83 K/UL
LYMPHOCYTES RELATIVE PERCENT: 15 % (ref 24–44)
MCH RBC QN AUTO: 31.7 PG (ref 27–31)
MCHC RBC AUTO-ENTMCNC: 31.1 G/DL (ref 32–36)
MCV RBC AUTO: 101.8 FL (ref 78–100)
MONOCYTES NFR BLD: 0.5 K/UL
MONOCYTES NFR BLD: 9 % (ref 0–5)
NEUTROPHILS NFR BLD: 75 % (ref 36–66)
NEUTS SEG NFR BLD: 4.28 K/UL
PARTIAL THROMBOPLASTIN TIME: 32.3 SEC (ref 25.1–37.1)
PLATELET # BLD AUTO: 195 K/UL (ref 140–440)
PMV BLD AUTO: 8.7 FL (ref 7.5–11.1)
POTASSIUM SERPL-SCNC: 4.2 MMOL/L (ref 3.5–5.1)
PROTHROMBIN TIME: 15.1 SEC (ref 11.7–14.5)
RBC # BLD AUTO: 4.42 M/UL (ref 4.6–6.2)
SODIUM SERPL-SCNC: 139 MMOL/L (ref 136–145)
WBC OTHER # BLD: 5.7 K/UL (ref 4–10.5)

## 2025-06-26 PROCEDURE — 85730 THROMBOPLASTIN TIME PARTIAL: CPT

## 2025-06-26 PROCEDURE — 85025 COMPLETE CBC W/AUTO DIFF WBC: CPT

## 2025-06-26 PROCEDURE — 85610 PROTHROMBIN TIME: CPT

## 2025-06-26 PROCEDURE — 80048 BASIC METABOLIC PNL TOTAL CA: CPT

## 2025-06-26 RX ORDER — SODIUM CHLORIDE 9 MG/ML
INJECTION, SOLUTION INTRAVENOUS PRN
Status: CANCELLED | OUTPATIENT
Start: 2025-06-26

## 2025-06-26 RX ORDER — SODIUM CHLORIDE 0.9 % (FLUSH) 0.9 %
5-40 SYRINGE (ML) INJECTION EVERY 12 HOURS SCHEDULED
Status: CANCELLED | OUTPATIENT
Start: 2025-06-26

## 2025-06-26 RX ORDER — SODIUM CHLORIDE 0.9 % (FLUSH) 0.9 %
5-40 SYRINGE (ML) INJECTION PRN
Status: CANCELLED | OUTPATIENT
Start: 2025-06-26

## 2025-06-26 ASSESSMENT — COPD QUESTIONNAIRES: CAT_SEVERITY: SEVERE

## 2025-06-26 NOTE — PROGRESS NOTES
Notified patient surgery @ [facility] on  6/27/25 @ 0945, arrival 0815. NPO status and morning medications reviewed. Understanding verbalized.

## 2025-06-26 NOTE — ANESTHESIA PRE PROCEDURE
Department of Anesthesiology  Preprocedure Note       Name:  Kofi Pineda   Age:  80 y.o.  :  1945                                          MRN:  [id number]         Date:  2025      Surgeon: Surgeon(s):  Chris De Leon MD    Procedure: Procedure(s):  BRONCHOSCOPY    Medications prior to admission:   Prior to Admission medications    Medication Sig Start Date End Date Taking? Authorizing Provider   predniSONE (DELTASONE) 10 MG tablet Take 1 tablet by mouth daily 40mg daily for 2 days, 20 mg daily for 2 days, 10 mg daily for 2 days, 5 mg daily for 2 days 25   Chris De Leon MD   tamsulosin (FLOMAX) 0.4 MG capsule Take 2 capsules by mouth daily 25  Charles Nicholson MD   BREZTRI AEROSPHERE 160-9-4.8 MCG/ACT AERO Inhale 2 puffs into the lungs 2 times daily 25   Chris De Leon MD   Roflumilast (DALIRESP) 500 MCG tablet Take 0.5 tablets by mouth daily 1/2 qd 4/3/25   Charles Nicholson MD   atropine 1 % ophthalmic solution  10/16/24   Edgard Rodgers MD   dorzolamide-timolol (COSOPT) 2-0.5 % ophthalmic solution  25   Edgard Rodgers MD   albuterol sulfate HFA (PROVENTIL;VENTOLIN;PROAIR) 108 (90 Base) MCG/ACT inhaler Inhale 2 puffs into the lungs every 6 hours as needed for Wheezing 10/2/24   Charles Nicholson MD   levocetirizine (XYZAL) 5 MG tablet  24   Edgard Rodgers MD   Cyanocobalamin (VITAMIN B 12) 500 MCG TABS Take 1,000 mcg by mouth daily    Edgard Rodgers MD   Cholecalciferol (VITAMIN D3) 75 MCG (3000 UT) TABS Take 1,000 mcg by mouth daily    Edgard Rodgers MD   Omega-3 Fatty Acids (FISH OIL) 1000 MG capsule Take 1 capsule by mouth daily    Edgard Rodgers MD   furosemide (LASIX) 20 MG tablet Take 1 tablet by mouth every other day 4/10/24   Charles Nicholson MD   Multiple Vitamin (MVI, CELEBRATE, CHEWABLE TABLET) Take by mouth 10/24/23   Provider, MD Edgard   Ascorbic Acid (VITAMIN C) 1000 MG tablet

## 2025-06-27 ENCOUNTER — APPOINTMENT (OUTPATIENT)
Dept: GENERAL RADIOLOGY | Age: 80
DRG: 309 | End: 2025-06-27
Attending: INTERNAL MEDICINE
Payer: MEDICARE

## 2025-06-27 ENCOUNTER — ANESTHESIA (OUTPATIENT)
Dept: ENDOSCOPY | Age: 80
End: 2025-06-27
Payer: MEDICARE

## 2025-06-27 ENCOUNTER — APPOINTMENT (OUTPATIENT)
Dept: NON INVASIVE DIAGNOSTICS | Age: 80
DRG: 309 | End: 2025-06-27
Attending: HOSPITALIST
Payer: MEDICARE

## 2025-06-27 ENCOUNTER — HOSPITAL ENCOUNTER (INPATIENT)
Age: 80
LOS: 1 days | Discharge: HOME | DRG: 309 | End: 2025-06-28
Attending: INTERNAL MEDICINE | Admitting: HOSPITALIST
Payer: MEDICARE

## 2025-06-27 DIAGNOSIS — Z01.818 PREOP TESTING: Primary | ICD-10-CM

## 2025-06-27 DIAGNOSIS — I48.91 ATRIAL FIBRILLATION, UNSPECIFIED TYPE (HCC): ICD-10-CM

## 2025-06-27 LAB
ALBUMIN SERPL-MCNC: 3.5 G/DL (ref 3.4–5)
ALBUMIN/GLOB SERPL: 1.5 {RATIO} (ref 1.1–2.2)
ALP SERPL-CCNC: 86 U/L (ref 40–129)
ALT SERPL-CCNC: 24 U/L (ref 10–40)
ANION GAP SERPL CALCULATED.3IONS-SCNC: 9 MMOL/L (ref 9–17)
AST SERPL-CCNC: 25 U/L (ref 15–37)
BASOPHILS # BLD: 0.01 K/UL
BASOPHILS NFR BLD: 0 % (ref 0–1)
BILIRUB SERPL-MCNC: 0.7 MG/DL (ref 0–1)
BUN SERPL-MCNC: 11 MG/DL (ref 7–20)
CALCIUM SERPL-MCNC: 9.2 MG/DL (ref 8.3–10.6)
CHLORIDE SERPL-SCNC: 100 MMOL/L (ref 99–110)
CO2 SERPL-SCNC: 31 MMOL/L (ref 21–32)
CREAT SERPL-MCNC: 0.6 MG/DL (ref 0.8–1.3)
ECHO AO ROOT DIAM: 2.7 CM
ECHO AO ROOT INDEX: 1.59 CM/M2
ECHO AV AREA PEAK VELOCITY: 1.6 CM2
ECHO AV AREA VTI: 1.7 CM2
ECHO AV AREA/BSA PEAK VELOCITY: 0.9 CM2/M2
ECHO AV AREA/BSA VTI: 1 CM2/M2
ECHO AV MEAN GRADIENT: 3 MMHG
ECHO AV MEAN VELOCITY: 0.8 M/S
ECHO AV PEAK GRADIENT: 4 MMHG
ECHO AV PEAK VELOCITY: 1 M/S
ECHO AV VELOCITY RATIO: 0.6
ECHO AV VTI: 13.8 CM
ECHO BSA: 1.69 M2
ECHO EST RA PRESSURE: 15 MMHG
ECHO IVC PROX: 2.5 CM
ECHO LA AREA 4C: 24.8 CM2
ECHO LA DIAMETER INDEX: 2.18 CM/M2
ECHO LA DIAMETER: 3.7 CM
ECHO LA MAJOR AXIS: 7.4 CM
ECHO LA TO AORTIC ROOT RATIO: 1.37
ECHO LA VOL MOD A4C: 74 ML (ref 18–58)
ECHO LA VOLUME INDEX MOD A4C: 44 ML/M2 (ref 16–34)
ECHO LV E' LATERAL VELOCITY: 20.9 CM/S
ECHO LV E' SEPTAL VELOCITY: 9.7 CM/S
ECHO LV EDV A4C: 41 ML
ECHO LV EDV INDEX A4C: 24 ML/M2
ECHO LV EF PHYSICIAN: 55 %
ECHO LV EJECTION FRACTION A4C: 54 %
ECHO LV ESV A4C: 19 ML
ECHO LV ESV INDEX A4C: 11 ML/M2
ECHO LV FRACTIONAL SHORTENING: 47 % (ref 28–44)
ECHO LV INTERNAL DIMENSION DIASTOLE INDEX: 2 CM/M2
ECHO LV INTERNAL DIMENSION DIASTOLIC: 3.4 CM (ref 4.2–5.9)
ECHO LV INTERNAL DIMENSION SYSTOLIC INDEX: 1.06 CM/M2
ECHO LV INTERNAL DIMENSION SYSTOLIC: 1.8 CM
ECHO LV IVSD: 0.9 CM (ref 0.6–1)
ECHO LV MASS 2D: 106.3 G (ref 88–224)
ECHO LV MASS INDEX 2D: 62.5 G/M2 (ref 49–115)
ECHO LV POSTERIOR WALL DIASTOLIC: 1.2 CM (ref 0.6–1)
ECHO LV RELATIVE WALL THICKNESS RATIO: 0.71
ECHO LVOT AREA: 2.8 CM2
ECHO LVOT AV VTI INDEX: 0.61
ECHO LVOT DIAM: 1.9 CM
ECHO LVOT MEAN GRADIENT: 1 MMHG
ECHO LVOT PEAK GRADIENT: 1 MMHG
ECHO LVOT PEAK VELOCITY: 0.6 M/S
ECHO LVOT STROKE VOLUME INDEX: 14 ML/M2
ECHO LVOT SV: 23.8 ML
ECHO LVOT VTI: 8.4 CM
ECHO MV A VELOCITY: 0.38 M/S
ECHO MV E VELOCITY: 1.1 M/S
ECHO MV E/A RATIO: 2.89
ECHO MV E/E' LATERAL: 5.26
ECHO MV E/E' RATIO (AVERAGED): 8.3
ECHO MV E/E' SEPTAL: 11.34
ECHO RIGHT VENTRICULAR SYSTOLIC PRESSURE (RVSP): 64 MMHG
ECHO RV MID DIMENSION: 4 CM
ECHO TV REGURGITANT MAX VELOCITY: 3.51 M/S
ECHO TV REGURGITANT PEAK GRADIENT: 49 MMHG
EKG ATRIAL RATE: 111 BPM
EKG DIAGNOSIS: NORMAL
EKG Q-T INTERVAL: 320 MS
EKG QRS DURATION: 102 MS
EKG QTC CALCULATION (BAZETT): 459 MS
EKG R AXIS: 85 DEGREES
EKG T AXIS: 46 DEGREES
EKG VENTRICULAR RATE: 124 BPM
EOSINOPHIL # BLD: 0.02 K/UL
EOSINOPHILS RELATIVE PERCENT: 0 % (ref 0–3)
ERYTHROCYTE [DISTWIDTH] IN BLOOD BY AUTOMATED COUNT: 13.6 % (ref 11.7–14.9)
GFR, ESTIMATED: >90 ML/MIN/1.73M2
GLUCOSE SERPL-MCNC: 98 MG/DL (ref 74–99)
HCT VFR BLD AUTO: 38.8 % (ref 42–52)
HGB BLD-MCNC: 12.3 G/DL (ref 13.5–18)
IMM GRANULOCYTES # BLD AUTO: 0.01 K/UL
IMM GRANULOCYTES NFR BLD: 0 %
LYMPHOCYTES NFR BLD: 0.59 K/UL
LYMPHOCYTES RELATIVE PERCENT: 10 % (ref 24–44)
MCH RBC QN AUTO: 31.9 PG (ref 27–31)
MCHC RBC AUTO-ENTMCNC: 31.7 G/DL (ref 32–36)
MCV RBC AUTO: 100.8 FL (ref 78–100)
MONOCYTES NFR BLD: 0.44 K/UL
MONOCYTES NFR BLD: 8 % (ref 0–5)
NEUTROPHILS NFR BLD: 81 % (ref 36–66)
NEUTS SEG NFR BLD: 4.61 K/UL
PLATELET # BLD AUTO: 165 K/UL (ref 140–440)
PMV BLD AUTO: 8.6 FL (ref 7.5–11.1)
POTASSIUM SERPL-SCNC: 4.3 MMOL/L (ref 3.5–5.1)
PROT SERPL-MCNC: 5.9 G/DL (ref 6.4–8.2)
RBC # BLD AUTO: 3.85 M/UL (ref 4.6–6.2)
SODIUM SERPL-SCNC: 139 MMOL/L (ref 136–145)
TROPONIN I SERPL HS-MCNC: 30 NG/L (ref 0–22)
TROPONIN I SERPL HS-MCNC: 32 NG/L (ref 0–22)
TSH SERPL DL<=0.05 MIU/L-ACNC: 0.95 UIU/ML (ref 0.27–4.2)
WBC OTHER # BLD: 5.7 K/UL (ref 4–10.5)

## 2025-06-27 PROCEDURE — G0378 HOSPITAL OBSERVATION PER HR: HCPCS

## 2025-06-27 PROCEDURE — 2580000003 HC RX 258

## 2025-06-27 PROCEDURE — P9045 ALBUMIN (HUMAN), 5%, 250 ML: HCPCS

## 2025-06-27 PROCEDURE — 99222 1ST HOSP IP/OBS MODERATE 55: CPT | Performed by: INTERNAL MEDICINE

## 2025-06-27 PROCEDURE — 80053 COMPREHEN METABOLIC PANEL: CPT

## 2025-06-27 PROCEDURE — 6360000002 HC RX W HCPCS: Performed by: HOSPITALIST

## 2025-06-27 PROCEDURE — 3609027000 HC BRONCHOSCOPY: Performed by: INTERNAL MEDICINE

## 2025-06-27 PROCEDURE — 3700000001 HC ADD 15 MINUTES (ANESTHESIA): Performed by: INTERNAL MEDICINE

## 2025-06-27 PROCEDURE — 7100000001 HC PACU RECOVERY - ADDTL 15 MIN: Performed by: INTERNAL MEDICINE

## 2025-06-27 PROCEDURE — 7100000000 HC PACU RECOVERY - FIRST 15 MIN: Performed by: INTERNAL MEDICINE

## 2025-06-27 PROCEDURE — 84443 ASSAY THYROID STIM HORMONE: CPT

## 2025-06-27 PROCEDURE — 36415 COLL VENOUS BLD VENIPUNCTURE: CPT

## 2025-06-27 PROCEDURE — 5A09357 ASSISTANCE WITH RESPIRATORY VENTILATION, LESS THAN 24 CONSECUTIVE HOURS, CONTINUOUS POSITIVE AIRWAY PRESSURE: ICD-10-PCS | Performed by: HOSPITALIST

## 2025-06-27 PROCEDURE — 3700000000 HC ANESTHESIA ATTENDED CARE: Performed by: INTERNAL MEDICINE

## 2025-06-27 PROCEDURE — 93010 ELECTROCARDIOGRAM REPORT: CPT | Performed by: INTERNAL MEDICINE

## 2025-06-27 PROCEDURE — 93005 ELECTROCARDIOGRAM TRACING: CPT | Performed by: INTERNAL MEDICINE

## 2025-06-27 PROCEDURE — 84484 ASSAY OF TROPONIN QUANT: CPT

## 2025-06-27 PROCEDURE — 76000 FLUOROSCOPY <1 HR PHYS/QHP: CPT

## 2025-06-27 PROCEDURE — 6370000000 HC RX 637 (ALT 250 FOR IP): Performed by: INTERNAL MEDICINE

## 2025-06-27 PROCEDURE — 93306 TTE W/DOPPLER COMPLETE: CPT

## 2025-06-27 PROCEDURE — 2060000000 HC ICU INTERMEDIATE R&B

## 2025-06-27 PROCEDURE — 6370000000 HC RX 637 (ALT 250 FOR IP): Performed by: HOSPITALIST

## 2025-06-27 PROCEDURE — 2500000003 HC RX 250 WO HCPCS: Performed by: HOSPITALIST

## 2025-06-27 PROCEDURE — 94640 AIRWAY INHALATION TREATMENT: CPT

## 2025-06-27 PROCEDURE — 2500000003 HC RX 250 WO HCPCS

## 2025-06-27 PROCEDURE — 96372 THER/PROPH/DIAG INJ SC/IM: CPT

## 2025-06-27 PROCEDURE — 85025 COMPLETE CBC W/AUTO DIFF WBC: CPT

## 2025-06-27 PROCEDURE — 6360000002 HC RX W HCPCS

## 2025-06-27 PROCEDURE — 2500000003 HC RX 250 WO HCPCS: Performed by: ANESTHESIOLOGY

## 2025-06-27 PROCEDURE — 2709999900 HC NON-CHARGEABLE SUPPLY: Performed by: INTERNAL MEDICINE

## 2025-06-27 PROCEDURE — 93306 TTE W/DOPPLER COMPLETE: CPT | Performed by: INTERNAL MEDICINE

## 2025-06-27 RX ORDER — PROPOFOL 10 MG/ML
INJECTION, EMULSION INTRAVENOUS
Status: DISCONTINUED | OUTPATIENT
Start: 2025-06-27 | End: 2025-06-27 | Stop reason: SDUPTHER

## 2025-06-27 RX ORDER — LANOLIN ALCOHOL/MO/W.PET/CERES
1000 CREAM (GRAM) TOPICAL DAILY
Status: DISCONTINUED | OUTPATIENT
Start: 2025-06-27 | End: 2025-06-28 | Stop reason: HOSPADM

## 2025-06-27 RX ORDER — SODIUM CHLORIDE 9 MG/ML
INJECTION, SOLUTION INTRAVENOUS PRN
Status: DISCONTINUED | OUTPATIENT
Start: 2025-06-27 | End: 2025-06-27 | Stop reason: HOSPADM

## 2025-06-27 RX ORDER — SODIUM CHLORIDE 0.9 % (FLUSH) 0.9 %
5-40 SYRINGE (ML) INJECTION EVERY 12 HOURS SCHEDULED
Status: DISCONTINUED | OUTPATIENT
Start: 2025-06-27 | End: 2025-06-28 | Stop reason: HOSPADM

## 2025-06-27 RX ORDER — ROFLUMILAST 500 UG/1
250 TABLET ORAL DAILY
Status: DISCONTINUED | OUTPATIENT
Start: 2025-06-27 | End: 2025-06-28 | Stop reason: HOSPADM

## 2025-06-27 RX ORDER — ONDANSETRON 2 MG/ML
4 INJECTION INTRAMUSCULAR; INTRAVENOUS
Status: DISCONTINUED | OUTPATIENT
Start: 2025-06-27 | End: 2025-06-27 | Stop reason: HOSPADM

## 2025-06-27 RX ORDER — PROCHLORPERAZINE EDISYLATE 5 MG/ML
5 INJECTION INTRAMUSCULAR; INTRAVENOUS
Status: DISCONTINUED | OUTPATIENT
Start: 2025-06-27 | End: 2025-06-27 | Stop reason: HOSPADM

## 2025-06-27 RX ORDER — POLYETHYLENE GLYCOL 3350 17 G/17G
17 POWDER, FOR SOLUTION ORAL DAILY PRN
Status: DISCONTINUED | OUTPATIENT
Start: 2025-06-27 | End: 2025-06-28 | Stop reason: HOSPADM

## 2025-06-27 RX ORDER — ACETAMINOPHEN 325 MG/1
650 TABLET ORAL
Status: DISCONTINUED | OUTPATIENT
Start: 2025-06-27 | End: 2025-06-27 | Stop reason: HOSPADM

## 2025-06-27 RX ORDER — METOPROLOL TARTRATE 1 MG/ML
2.5 INJECTION, SOLUTION INTRAVENOUS ONCE
Status: COMPLETED | OUTPATIENT
Start: 2025-06-27 | End: 2025-06-27

## 2025-06-27 RX ORDER — ROCURONIUM BROMIDE 10 MG/ML
INJECTION, SOLUTION INTRAVENOUS
Status: DISCONTINUED | OUTPATIENT
Start: 2025-06-27 | End: 2025-06-27 | Stop reason: SDUPTHER

## 2025-06-27 RX ORDER — MAGNESIUM SULFATE IN WATER 40 MG/ML
2000 INJECTION, SOLUTION INTRAVENOUS PRN
Status: DISCONTINUED | OUTPATIENT
Start: 2025-06-27 | End: 2025-06-28 | Stop reason: HOSPADM

## 2025-06-27 RX ORDER — ONDANSETRON 2 MG/ML
4 INJECTION INTRAMUSCULAR; INTRAVENOUS EVERY 6 HOURS PRN
Status: DISCONTINUED | OUTPATIENT
Start: 2025-06-27 | End: 2025-06-28 | Stop reason: HOSPADM

## 2025-06-27 RX ORDER — METOPROLOL TARTRATE 25 MG/1
25 TABLET, FILM COATED ORAL EVERY 8 HOURS
Status: DISCONTINUED | OUTPATIENT
Start: 2025-06-27 | End: 2025-06-28

## 2025-06-27 RX ORDER — VASOPRESSIN 20 [USP'U]/ML
INJECTION, SOLUTION INTRAVENOUS
Status: DISCONTINUED | OUTPATIENT
Start: 2025-06-27 | End: 2025-06-27 | Stop reason: SDUPTHER

## 2025-06-27 RX ORDER — CHLORAL HYDRATE 500 MG
1 CAPSULE ORAL DAILY
Status: DISCONTINUED | OUTPATIENT
Start: 2025-06-27 | End: 2025-06-27 | Stop reason: RX

## 2025-06-27 RX ORDER — FENTANYL CITRATE 50 UG/ML
25 INJECTION, SOLUTION INTRAMUSCULAR; INTRAVENOUS EVERY 5 MIN PRN
Status: DISCONTINUED | OUTPATIENT
Start: 2025-06-27 | End: 2025-06-27 | Stop reason: HOSPADM

## 2025-06-27 RX ORDER — ASCORBIC ACID 500 MG
1000 TABLET ORAL DAILY
Status: DISCONTINUED | OUTPATIENT
Start: 2025-06-27 | End: 2025-06-28 | Stop reason: HOSPADM

## 2025-06-27 RX ORDER — HYDRALAZINE HYDROCHLORIDE 20 MG/ML
10 INJECTION INTRAMUSCULAR; INTRAVENOUS
Status: DISCONTINUED | OUTPATIENT
Start: 2025-06-27 | End: 2025-06-27 | Stop reason: HOSPADM

## 2025-06-27 RX ORDER — POTASSIUM CHLORIDE 1500 MG/1
40 TABLET, EXTENDED RELEASE ORAL PRN
Status: DISCONTINUED | OUTPATIENT
Start: 2025-06-27 | End: 2025-06-28 | Stop reason: HOSPADM

## 2025-06-27 RX ORDER — SODIUM CHLORIDE 0.9 % (FLUSH) 0.9 %
5-40 SYRINGE (ML) INJECTION PRN
Status: DISCONTINUED | OUTPATIENT
Start: 2025-06-27 | End: 2025-06-27 | Stop reason: HOSPADM

## 2025-06-27 RX ORDER — ACETAMINOPHEN 650 MG/1
650 SUPPOSITORY RECTAL EVERY 6 HOURS PRN
Status: DISCONTINUED | OUTPATIENT
Start: 2025-06-27 | End: 2025-06-28 | Stop reason: HOSPADM

## 2025-06-27 RX ORDER — FUROSEMIDE 20 MG/1
20 TABLET ORAL EVERY OTHER DAY
Status: DISCONTINUED | OUTPATIENT
Start: 2025-06-28 | End: 2025-06-28 | Stop reason: HOSPADM

## 2025-06-27 RX ORDER — BUDESONIDE AND FORMOTEROL FUMARATE DIHYDRATE 160; 4.5 UG/1; UG/1
2 AEROSOL RESPIRATORY (INHALATION)
Status: DISCONTINUED | OUTPATIENT
Start: 2025-06-28 | End: 2025-06-28 | Stop reason: HOSPADM

## 2025-06-27 RX ORDER — SODIUM CHLORIDE 9 MG/ML
INJECTION, SOLUTION INTRAVENOUS PRN
Status: DISCONTINUED | OUTPATIENT
Start: 2025-06-27 | End: 2025-06-28 | Stop reason: HOSPADM

## 2025-06-27 RX ORDER — CETIRIZINE HYDROCHLORIDE 10 MG/1
5 TABLET ORAL DAILY
Status: DISCONTINUED | OUTPATIENT
Start: 2025-06-27 | End: 2025-06-28 | Stop reason: HOSPADM

## 2025-06-27 RX ORDER — NALOXONE HYDROCHLORIDE 0.4 MG/ML
INJECTION, SOLUTION INTRAMUSCULAR; INTRAVENOUS; SUBCUTANEOUS PRN
Status: DISCONTINUED | OUTPATIENT
Start: 2025-06-27 | End: 2025-06-27 | Stop reason: HOSPADM

## 2025-06-27 RX ORDER — SODIUM CHLORIDE, SODIUM LACTATE, POTASSIUM CHLORIDE, CALCIUM CHLORIDE 600; 310; 30; 20 MG/100ML; MG/100ML; MG/100ML; MG/100ML
INJECTION, SOLUTION INTRAVENOUS
Status: DISCONTINUED | OUTPATIENT
Start: 2025-06-27 | End: 2025-06-27 | Stop reason: SDUPTHER

## 2025-06-27 RX ORDER — POTASSIUM CHLORIDE 7.45 MG/ML
10 INJECTION INTRAVENOUS PRN
Status: DISCONTINUED | OUTPATIENT
Start: 2025-06-27 | End: 2025-06-28 | Stop reason: HOSPADM

## 2025-06-27 RX ORDER — ALBUMIN HUMAN 50 G/1000ML
SOLUTION INTRAVENOUS
Status: DISCONTINUED | OUTPATIENT
Start: 2025-06-27 | End: 2025-06-27 | Stop reason: SDUPTHER

## 2025-06-27 RX ORDER — ALBUTEROL SULFATE 90 UG/1
2 INHALANT RESPIRATORY (INHALATION) EVERY 6 HOURS PRN
Status: DISCONTINUED | OUTPATIENT
Start: 2025-06-27 | End: 2025-06-28 | Stop reason: HOSPADM

## 2025-06-27 RX ORDER — DILTIAZEM HYDROCHLORIDE 5 MG/ML
5 INJECTION INTRAVENOUS ONCE
Status: DISCONTINUED | OUTPATIENT
Start: 2025-06-27 | End: 2025-06-28

## 2025-06-27 RX ORDER — DAPSONE 25 MG/1
25 TABLET ORAL DAILY
Status: DISCONTINUED | OUTPATIENT
Start: 2025-06-27 | End: 2025-06-28 | Stop reason: HOSPADM

## 2025-06-27 RX ORDER — ONDANSETRON 4 MG/1
4 TABLET, ORALLY DISINTEGRATING ORAL EVERY 8 HOURS PRN
Status: DISCONTINUED | OUTPATIENT
Start: 2025-06-27 | End: 2025-06-28 | Stop reason: HOSPADM

## 2025-06-27 RX ORDER — SODIUM CHLORIDE 0.9 % (FLUSH) 0.9 %
5-40 SYRINGE (ML) INJECTION PRN
Status: DISCONTINUED | OUTPATIENT
Start: 2025-06-27 | End: 2025-06-28 | Stop reason: HOSPADM

## 2025-06-27 RX ORDER — MONTELUKAST SODIUM 10 MG/1
10 TABLET ORAL NIGHTLY
Status: DISCONTINUED | OUTPATIENT
Start: 2025-06-27 | End: 2025-06-28 | Stop reason: HOSPADM

## 2025-06-27 RX ORDER — SODIUM CHLORIDE 0.9 % (FLUSH) 0.9 %
5-40 SYRINGE (ML) INJECTION EVERY 12 HOURS SCHEDULED
Status: DISCONTINUED | OUTPATIENT
Start: 2025-06-27 | End: 2025-06-27 | Stop reason: HOSPADM

## 2025-06-27 RX ORDER — ACETAMINOPHEN 325 MG/1
650 TABLET ORAL EVERY 6 HOURS PRN
Status: DISCONTINUED | OUTPATIENT
Start: 2025-06-27 | End: 2025-06-28 | Stop reason: HOSPADM

## 2025-06-27 RX ORDER — VITAMIN B COMPLEX
1000 TABLET ORAL DAILY
Status: DISCONTINUED | OUTPATIENT
Start: 2025-06-27 | End: 2025-06-28 | Stop reason: HOSPADM

## 2025-06-27 RX ORDER — LIDOCAINE HYDROCHLORIDE 20 MG/ML
INJECTION, SOLUTION INTRAVENOUS
Status: DISCONTINUED | OUTPATIENT
Start: 2025-06-27 | End: 2025-06-27 | Stop reason: SDUPTHER

## 2025-06-27 RX ORDER — M-VIT,TX,IRON,MINS/CALC/FOLIC 27MG-0.4MG
1 TABLET ORAL DAILY
Status: DISCONTINUED | OUTPATIENT
Start: 2025-06-27 | End: 2025-06-28 | Stop reason: HOSPADM

## 2025-06-27 RX ORDER — TAMSULOSIN HYDROCHLORIDE 0.4 MG/1
0.8 CAPSULE ORAL DAILY
Status: DISCONTINUED | OUTPATIENT
Start: 2025-06-27 | End: 2025-06-28 | Stop reason: HOSPADM

## 2025-06-27 RX ORDER — ASPIRIN 81 MG/1
81 TABLET, CHEWABLE ORAL DAILY
Status: DISCONTINUED | OUTPATIENT
Start: 2025-06-27 | End: 2025-06-28 | Stop reason: HOSPADM

## 2025-06-27 RX ORDER — ENOXAPARIN SODIUM 100 MG/ML
1 INJECTION SUBCUTANEOUS 2 TIMES DAILY
Status: DISCONTINUED | OUTPATIENT
Start: 2025-06-27 | End: 2025-06-28 | Stop reason: HOSPADM

## 2025-06-27 RX ADMIN — PHENYLEPHRINE HYDROCHLORIDE 200 MCG: 10 INJECTION INTRAVENOUS at 10:12

## 2025-06-27 RX ADMIN — TAMSULOSIN HYDROCHLORIDE 0.8 MG: 0.4 CAPSULE ORAL at 15:51

## 2025-06-27 RX ADMIN — PHENYLEPHRINE HYDROCHLORIDE 200 MCG: 10 INJECTION INTRAVENOUS at 10:21

## 2025-06-27 RX ADMIN — LIDOCAINE HYDROCHLORIDE 100 MG: 20 INJECTION, SOLUTION INTRAVENOUS at 10:07

## 2025-06-27 RX ADMIN — VASOPRESSIN 1 UNITS: 20 INJECTION INTRAVENOUS at 10:17

## 2025-06-27 RX ADMIN — Medication 1000 MCG: at 15:49

## 2025-06-27 RX ADMIN — Medication 1 TABLET: at 15:51

## 2025-06-27 RX ADMIN — VASOPRESSIN 1 UNITS: 20 INJECTION INTRAVENOUS at 10:20

## 2025-06-27 RX ADMIN — ENOXAPARIN SODIUM 60 MG: 100 INJECTION SUBCUTANEOUS at 22:00

## 2025-06-27 RX ADMIN — MONTELUKAST 10 MG: 10 TABLET, FILM COATED ORAL at 22:00

## 2025-06-27 RX ADMIN — PROPOFOL 60 MG: 10 INJECTION, EMULSION INTRAVENOUS at 10:07

## 2025-06-27 RX ADMIN — ENOXAPARIN SODIUM 60 MG: 100 INJECTION SUBCUTANEOUS at 15:51

## 2025-06-27 RX ADMIN — PHENYLEPHRINE HYDROCHLORIDE 300 MCG: 10 INJECTION INTRAVENOUS at 10:15

## 2025-06-27 RX ADMIN — METOPROLOL TARTRATE 25 MG: 25 TABLET, FILM COATED ORAL at 22:00

## 2025-06-27 RX ADMIN — PHENYLEPHRINE HYDROCHLORIDE 300 MCG: 10 INJECTION INTRAVENOUS at 10:18

## 2025-06-27 RX ADMIN — DAPSONE 25 MG: 25 TABLET ORAL at 16:13

## 2025-06-27 RX ADMIN — METOPROLOL TARTRATE 2.5 MG: 1 INJECTION, SOLUTION INTRAVENOUS at 10:55

## 2025-06-27 RX ADMIN — SODIUM CHLORIDE, PRESERVATIVE FREE 10 ML: 5 INJECTION INTRAVENOUS at 22:00

## 2025-06-27 RX ADMIN — SUGAMMADEX 200 MG: 100 INJECTION, SOLUTION INTRAVENOUS at 10:24

## 2025-06-27 RX ADMIN — ROFLUMILAST 250 MCG: 500 TABLET ORAL at 16:13

## 2025-06-27 RX ADMIN — SODIUM CHLORIDE, POTASSIUM CHLORIDE, SODIUM LACTATE AND CALCIUM CHLORIDE: 600; 310; 30; 20 INJECTION, SOLUTION INTRAVENOUS at 09:51

## 2025-06-27 RX ADMIN — CETIRIZINE HYDROCHLORIDE 5 MG: 10 TABLET, FILM COATED ORAL at 15:49

## 2025-06-27 RX ADMIN — ROCURONIUM BROMIDE 50 MG: 10 INJECTION, SOLUTION INTRAVENOUS at 10:08

## 2025-06-27 RX ADMIN — METOPROLOL TARTRATE 25 MG: 25 TABLET, FILM COATED ORAL at 15:50

## 2025-06-27 RX ADMIN — ALBUTEROL SULFATE 2 PUFF: 90 AEROSOL, METERED RESPIRATORY (INHALATION) at 19:47

## 2025-06-27 RX ADMIN — ASPIRIN 81 MG: 81 TABLET, CHEWABLE ORAL at 15:49

## 2025-06-27 RX ADMIN — OXYCODONE HYDROCHLORIDE AND ACETAMINOPHEN 1000 MG: 500 TABLET ORAL at 15:50

## 2025-06-27 RX ADMIN — Medication 1000 UNITS: at 15:51

## 2025-06-27 RX ADMIN — ALBUMIN (HUMAN) 12.5 G: 12.5 INJECTION, SOLUTION INTRAVENOUS at 10:20

## 2025-06-27 ASSESSMENT — ENCOUNTER SYMPTOMS
VOMITING: 0
NAUSEA: 0
RHINORRHEA: 0
COUGH: 1
SHORTNESS OF BREATH: 1
DIARRHEA: 0
SHORTNESS OF BREATH: 1
SORE THROAT: 0
WHEEZING: 1

## 2025-06-27 ASSESSMENT — LIFESTYLE VARIABLES: SMOKING_STATUS: 0

## 2025-06-27 ASSESSMENT — PAIN - FUNCTIONAL ASSESSMENT: PAIN_FUNCTIONAL_ASSESSMENT: 0-10

## 2025-06-27 NOTE — PROGRESS NOTES
.  Herbal and Nutritional Product Restrictions      The following herbal, alternative, and/or nutritional/dietary supplement product(s) has been discontinued per [employer] approved policy:      Fish Oil 1000mg daily    Please reorder upon discharge if appropriate.    Thank you,  Velasquez Castillo [facility]  6/27/2025 1:23 PM

## 2025-06-27 NOTE — CONSULTS
CARDIOLOGY CONSULT NOTE         Reason for consultation: Atrial fibrillation with rapid ventricular rate      Primary care physician: Charles Nicholson MD      Chief Complaints :No chief complaint on file.       History of present illness:Kofi is a 80 y.o.year old male with prior history of COPD who was here for bronchoscopy today.  In the [facility] suite there were noted to have atrial fibrillation rapid ventricular rate.  Subsequently the procedure was canceled and patient was admitted.  Patient himself denies any previous history of palpitations, heart racing.  He is unaware of diagnosis of atrial fibrillation.  He does follow-up with cardiology because of suspected PFO/ASD and tricuspid regurgitation.    Review of Systems:     All systems negative except as marked.        Physical Examination:    Vitals:    06/27/25 1406   BP: (!) 116/92   Pulse: (!) 106   Resp:    Temp:    SpO2:         General Appearance:  No distress, conversant    Constitutional:  No acute distress, non-toxic appearance.    HENT:  Normocephalic, Atraumatic,   Eyes:  PERRL, EOMI, Conjunctiva normal, No discharge.   Respiratory:  No respiratory distress, No wheezing  Cardiovascular: S1, S2, no murmurs, gallops. JVD wnl  Abdomen /GI:   Soft, No tenderness   Genitourinary: No costovertebral angle tenderness   Musculoskeletal:  No edema, no tenderness, no deformities.   Integument:  Well hydrated, no rash   Neurologic:  Alert & oriented x 3, no focal deficits noted       Medical decision making and Data review:    Lab Review   Recent Labs     06/27/25  1400   WBC 5.7   HGB 12.3*   HCT 38.8*         Recent Labs     06/27/25  1400      K 4.3      CO2 31   BUN 11   CREATININE 0.6*     Recent Labs     06/27/25  1400   AST 25   ALT 24   BILITOT 0.7   ALKPHOS 86     No results for input(s): \"TROPONINT\" in the last 72 hours.    No results for input(s): \"PROBNP\" in the last 72 hours.  Lab Results   Component Value Date    INR 1.2  06/26/2025    PROTIME 15.1 (H) 06/26/2025       EKG: (reviewed by myself): His EKG showed atrial fibrillation with rapid ventricular rate there were no significant ischemic changes noted.    ECHO:(reviewed by myself): His last transmission echocardiogram from July 2024 showed:    Left ventricle: Systolic function is normal.   2. Mitral valve: There is mild regurgitation.   3. Left atrium: There is no evidence of a thrombus in the atrial cavity or      appendage.   4. Right atrium: There is a prominent Chiari network.   5. Atrial septum: Color Doppler shows a largeleft-to-right atrial level      shunt. Combined fenestrated ASD and PFO.   6. Pericardium, extracardiac: There is no pericardial effusion.     Chest Xray:(reviewed by myself): His chest x-ray from June 13, 2025 showed:  1. Ongoing right basilar airspace opacities with slightly increased left basilar   airspace opacities suspicious for ongoing changes of pneumonia, possibly in   part related to aspiration given presence of debris within the airways on prior   study. Continued follow-up to resolution is recommended.  2. Small right pleural effusion, unchanged.            All labs, medications and tests reviewed by myself including data  from outside source , patient and available family .  Continue all other medications of all above medical condition listed as is.     Impression and Recommendations:    Atrial fibrillation rapid ventricular rate  Chronic lung infiltrate  ASD/PFO with left-to-right shunt        80 y.o.year old with above medical history.  As above this patient has new onset atrial fibrillation.  At present I would recommend rate control approach with the metoprolol and diltiazem.  Once his heart rate is better controlled he can be switched to oral metoprolol only.  Based on his [id number] score (at least based on his age 4) I would recommend start him on systemic anticoagulation.  We will continue to follow-up    Thank you  much for consult

## 2025-06-27 NOTE — PROGRESS NOTES
Confirmed to Marcelina Cotto via [employer] , patient can eat and can proceed with Lovenox and PO meds

## 2025-06-27 NOTE — ANESTHESIA POSTPROCEDURE EVALUATION
Department of Anesthesiology  Postprocedure Note    Patient: Kofi Pineda  MRN: [id number]  YOB: 1945  Date of evaluation: 6/27/2025    Procedure Summary       Date: 06/27/25 Room / Location: [address]    Anesthesia Start: 0951 Anesthesia Stop: 1038    Procedure: BRONCHOSCOPY (Abdomen) Diagnosis:       Mild bibasilar atelectasis      (Mild bibasilar atelectasis [J98.11])    Surgeons: Chris De Leon MD Responsible Provider: Eric Du MD    Anesthesia Type: General ASA Status: 3            Anesthesia Type: General    Gene Phase I: Gene Score: 9    Gene Phase II:      Anesthesia Post Evaluation    Patient location during evaluation: bedside  Patient participation: complete - patient participated  Level of consciousness: awake and alert  Airway patency: patent  Nausea & Vomiting: no nausea and no vomiting  Cardiovascular status: hemodynamically stable  Respiratory status: acceptable  Hydration status: euvolemic  Comments: Patient noted to be in a-fib with RVR on induction and became profoundly hypotensive after intubation - no prior h/o atrial arrhythmias. Resuscitative efforts mildly successful and thus discussion with pulmonologist decision made to cancel case and admit for observation. On emergence BP normotensive and metoprolol titrated for HR. EKG obtained in PACU and cardiology consulted. Further directions from cardiology team. Admit to monitored inpatient bed.    Pain management: adequate        No notable events documented.

## 2025-06-27 NOTE — PROGRESS NOTES
Pt was brought to endoscopy for bronch and after intubation developed hypotension and atrial fibrillation with RVR. Bronchoscopy procedure is cancelled and was not done. Pt will be admitted to hospital under hospitalist service.spoke with dr tomasz asif , the hospitalist on call . He has accepted the patient and will be admitted under his service.  I also spoke with family and updated him on pt condition and told him that he will be admitted to hospital  He does have any prior h/o atrial fibrillation and other cardiac issues. He has severe copd and is a smoker. He has received couple of rounds of antibx as outpt for davion lower lobe infiltrate and debris in bronchus

## 2025-06-27 NOTE — PLAN OF CARE
Problem: Pain  Goal: Verbalizes/displays adequate comfort level or baseline comfort level  Outcome: Progressing     Problem: Discharge Planning  Goal: Discharge to home or other facility with appropriate resources  Outcome: Progressing     Problem: Risk for Elopement  Goal: Patient will not exit the unit/facility without proper excort  Outcome: Progressing  Flowsheets (Taken 6/27/2025 1300)  Nursing Interventions for Elopement Risk: Assist with personal care needs such as toileting, eating, dressing, as needed to reduce the risk of wandering     Problem: Safety - Adult  Goal: Free from fall injury  Outcome: Progressing     Problem: Chronic Conditions and Co-morbidities  Goal: Patient's chronic conditions and co-morbidity symptoms are monitored and maintained or improved  Outcome: Progressing

## 2025-06-27 NOTE — H&P
V2.0  History and Physical      Name:  Kofi Pineda /Age/Sex: 1945  (80 y.o. male)   MRN & CSN:  [id number] & [id number] Encounter Date/Time: 2025 11:46 AM EDT   Location:  ENDO/NONE PCP: Charles Nicholson MD       Hospital Day: 1    Assessment and Plan:   Kofi Pineda is a 80 y.o. male who presents with New onset a-fib (HCC)    New onset A-fib with RVR-prior to bronchoscopy, patient went into A-fib with RVR.  He received 2.5 mg of metoprolol.  Started on Cardizem drip.  Check echo.  [id number]: 2.  Placed on therapeutic Lovenox.  Cardiology consult.  Check TSH.  Check troponins.  EKG shows A-fib with RVR.  Recent heart cath 2024: Normal coronary arteries; moderate pulmonary hypertension due to right ventricular volume overload.  Lung infiltrate-CT chest 2025 shows right lower lobe consolidation with debris in right lower lobe bronchus.  Patient has been treated with antibiotics and steroids.  Patient was to have bronchoscopy today, but it was canceled due to patient going into A-fib with RVR.  Atrial septal aneurysm-BALBIR 2025 showed fenestrated ASD with predominantly left to right flow  Moderate-severe TR-had BALBIR on 2025 which showed moderate-severely dilated right ventricle and severe TR.  COPD-Singulair, Breztri, Daliresp, albuterol  BPH-Flomax    Patient is admitted as inpatient.  Patient has stated that he does not plan on staying overnight.  It was explained to him that with A-fib with RVR, if it is not properly treated he could pass out or develop stroke or other complications from not treating it appropriately.  Patient understands the risk and benefits, and states that he does plan on leaving back for home later today.    She was also advised that he needs to be on anticoagulation for his A-fib due to the risk of stroke.  He denies any history of bleeding.  He also denies any history of falls.  It was explained to him that the benefits of being on  Overall Financial Resource Strain (CARDIA)     Difficulty of Paying Living Expenses: Not hard at all   Food Insecurity: No Food Insecurity (2/5/2025)    Hunger Vital Sign     Worried About Running Out of Food in the Last Year: Never true     Ran Out of Food in the Last Year: Never true   Transportation Needs: No Transportation Needs (2/5/2025)    PRAPARE - Transportation     Lack of Transportation (Medical): No     Lack of Transportation (Non-Medical): No   Housing Stability: Low Risk  (2/5/2025)    Housing Stability Vital Sign     Unable to Pay for Housing in the Last Year: No     Number of Times Moved in the Last Year: 0     Homeless in the Last Year: No       Medications:   Medications:    sodium chloride flush  5-40 mL IntraVENous 2 times per day    vitamin C  1,000 mg Oral Daily    aspirin  81 mg Oral Daily    Vitamin B 12  1,000 mcg Oral Daily    dapsone  25 mg Oral Daily    [START ON 6/28/2025] furosemide  20 mg Oral Every Other Day    cetirizine  5 mg Oral Daily    montelukast  10 mg Oral Nightly    MVI (CELEBRATE) chewable tablet  1 tablet Oral Daily    fish oil  1 capsule Oral Daily    Roflumilast  250 mcg Oral Daily    tamsulosin  0.8 mg Oral Daily    Vitamin D3  1,000 mcg Oral Daily    budesonide-formoterol  2 puff Inhalation BID RT    And    tiotropium  2 puff Inhalation Daily RT    sodium chloride flush  5-40 mL IntraVENous 2 times per day    dilTIAZem  5 mg IntraVENous Once    enoxaparin  1 mg/kg SubCUTAneous BID      Infusions:    sodium chloride      sodium chloride      dilTIAZem       PRN Meds: naloxone 0.4 mg in 10 mL sodium chloride syringe, , PRN  sodium chloride flush, 5-40 mL, PRN  sodium chloride, , PRN  acetaminophen, 650 mg, Once PRN  fentanNYL, 25 mcg, Q5 Min PRN  HYDROmorphone, 0.5 mg, Q5 Min PRN  ondansetron, 4 mg, Once PRN  prochlorperazine, 5 mg, Once PRN  hydrALAZINE, 10 mg, Q15 Min PRN  albuterol sulfate HFA, 2 puff, Q6H PRN  sodium chloride flush, 5-40 mL, PRN  sodium chloride, ,  Hours   No results found.        Electronically signed by Devendra Brumfield MD on 6/27/2025 at 11:46 AM

## 2025-06-27 NOTE — CONSULTS
Subjective:   CHIEF COMPLAINT / HPI:  80 year old male who was admitted to same day surgery by myself for bronchoscopy as he has davion LL infiltrate unresponsive to couple of rounds af antibx as outpt and also debris in main bronchus. He developed hypotension and atrial fibrillation with rapid ventricular response post intubation. Bronchoscopy was cancelled and he is admitted to hospital. He has underlying severe copd and is a smoker.      Past Medical History:  Past Medical History:   Diagnosis Date    COPD (chronic obstructive pulmonary disease) (HCC)     Emphysema of lung (HCC)        Past Surgical History:        Procedure Laterality Date    CATARACT REMOVAL Right        Current Medications:    Current Facility-Administered Medications: albuterol sulfate HFA (PROVENTIL;VENTOLIN;PROAIR) 108 (90 Base) MCG/ACT inhaler 2 puff, 2 puff, Inhalation, Q6H PRN  ascorbic acid (VITAMIN C) tablet 1,000 mg, 1,000 mg, Oral, Daily  aspirin chewable tablet 81 mg, 81 mg, Oral, Daily  vitamin B-12 (CYANOCOBALAMIN) tablet 1,000 mcg, 1,000 mcg, Oral, Daily  dapsone tablet 25 mg, 25 mg, Oral, Daily  [START ON 6/28/2025] furosemide (LASIX) tablet 20 mg, 20 mg, Oral, Every Other Day  cetirizine (ZYRTEC) tablet 5 mg, 5 mg, Oral, Daily  montelukast (SINGULAIR) tablet 10 mg, 10 mg, Oral, Nightly  therapeutic multivitamin-minerals 1 tablet, 1 tablet, Oral, Daily  Roflumilast (DALIRESP) tablet 250 mcg, 250 mcg, Oral, Daily  tamsulosin (FLOMAX) capsule 0.8 mg, 0.8 mg, Oral, Daily  Vitamin D (CHOLECALCIFEROL) tablet 1,000 Units, 1,000 Units, Oral, Daily  [START ON 6/28/2025] budesonide-formoterol (SYMBICORT) 160-4.5 MCG/ACT inhaler 2 puff, 2 puff, Inhalation, BID RT **AND** [START ON 6/28/2025] tiotropium (SPIRIVA RESPIMAT) 2.5 MCG/ACT inhaler 5 mcg, 2 puff, Inhalation, Daily RT  sodium chloride flush 0.9 % injection 5-40 mL, 5-40 mL, IntraVENous, 2 times per day  sodium chloride flush 0.9 % injection 5-40 mL, 5-40 mL, IntraVENous, PRN  0.9 %  including polyuria, polydipsia and polyphagia    MUSCULOSKELETAL:  negative for  pain, joint swelling, decreased range of motion and muscle weakness  NEUROLOGICAL:  negative for headaches, slurred speech, unilateral weakness  PSYCHIATRIC/BEHAVIORAL: negative for hallucinations, behavioral problems, confusion and agitation.     Objective:   PHYSICAL EXAM:      VITALS:    Vitals:    06/27/25 1146 06/27/25 1202 06/27/25 1216 06/27/25 1406   BP: 116/77 (!) 109/47 107/77 (!) 116/92   Pulse: 100 99 (!) 111 (!) 106   Resp: 17 18 17    Temp: 97.6 °F (36.4 °C)      TempSrc: Temporal      SpO2: 99% 97% 98%    Weight:    61.7 kg (136 lb)   Height:    1.676 m (5' 6\")         CONSTITUTIONAL:  awake, alert, cooperative, no apparent distress, and appears stated age  NECK:  Supple, symmetrical, trachea midline, no adenopathy, thyroid symmetric, not enlarged and no tenderness  CHEST: Chest expansion equal and symmetrical, no intercostal retraction.  LUNGS:  no increased work of breathing, has expiratory wheezes both lungs, no crackles.  CARDIOVASCULAR: S1 and S2, no edema and no JVD  ABDOMEN:  normal bowel sounds, non-distended and no masses palpated, and no tenderness to palpation. No hepatospleenomegaly  LYMPHADENOPATHY:  no axillary or supraclavicular adenopathy. No cervical adnenopathy  PSYCHIATRIC: Oriented to person place and time. No obvious depression or anxiety.  MUSCULOSKELETAL: No obvious misalignment or effusion of the joints. No clubbing, cyanosis of the digits.  RIGHT AND LEFT LOWER EXTREMITIES: No edema, no inflammation, no tenderness.  SKIN:  normal skin color, texture, turgor and no redness, warmth, or swelling. No palpable nodules    DATA:                        Assessment:     Abel LL lung infiltrate and debris in mainstem bronchus  Severe copd  New onset atrial fib with RVR  Atrial septal aneurysm  Atrial septal defect with left to right flow  Severe right vent dilatation  Mod pulm htn           Plan:     D/w

## 2025-06-27 NOTE — PROGRESS NOTES
4 Eyes Skin Assessment     NAME:  Kofi Pineda  YOB: 1945  MEDICAL RECORD NUMBER:  [id number]    The patient is being assessed for  Admission    I agree that at least one RN has performed a thorough Head to Toe Skin Assessment on the patient. ALL assessment sites listed below have been assessed.      Areas assessed by both nurses:    Head, Face, Ears, Shoulders, Back, Chest, Arms, Elbows, Hands, Sacrum. Buttock, Coccyx, Ischium, Legs. Feet and Heels, and Under Medical Devices         Does the Patient have a Wound? Skin tear left hand    Nazario Prevention initiated by RN: Yes  Wound Care Orders initiated by RN: No    Pressure Injury (Stage 1,2,3,4, Unstageable, DTI, NWPT, and Complex wounds) if present, place Wound referral order by RN under : No    New Ostomies, if present place, Ostomy referral order under : No     Nurse 1 eSignature: Electronically signed by Danita Islas RN on 6/27/25 at 1:30 PM EDT    **SHARE this note so that the co-signing nurse can place an eSignature**    Nurse 2 eSignature: Electronically signed by Yoselin Can RN on 6/27/25 at 2:39 PM EDT

## 2025-06-27 NOTE — PROGRESS NOTES
1035 Patient arrives to PACU, placed on cardiac monitor, alarms on.  Patient arrives on simple mask but transitioned to bipap 12/5, 40%.  Respirations tachypneic.  Congested cough with occasional thick secretions.  Patient able to control secretions.  Patient bronchoscopy aborted due to new onset atrial fibrillation with RVR.  Patient denies pain and denies SOB.  Denies feeling CP or fluttering in chest.  1038 STAT EKG ordered.  1046 Dr. De Leon at BS to speak with patient and answer questions.  MD attempted to explain atrial fibrillation to patient and need for admission.  Patient repeatedly states that he doesn't want to be admitted and doesn't seem to understand risk with not being evaluated for a fib.  Dr. De Leon spoke to Dr. Brumfield, hospitalist, for admission.  1048 EKG completed at BS per Tona.  1055 Patient medicated with 2.5mg Metoprolol for HR due to Dr. Du order.  1058 Patient incontinent of stool but still requests bedpan.  Patient placed on bedpan.  Large soft BM.  Urinal used to void with 200ml clear, dark yellow urine.  Clarissa care given.  Depends placed per patients request.  Gown and linens changed.  Warm blankets placed on patient for comfort.  1112 Dr. Du to BS to re evaluate patient.  Ordered to discontinue bipap.  Bipap stopped and patient placed on 2L/NC.  Patient denies home O2.  Patient appears to still be somewhat winded with speaking and tachypneic but patient denies SOB.   1119 Cardiology consult placed for Dr. Du order and perfectserve message sent to Dr. Hewitt who states he will be to see patient shortly.  1122 Dr. Brumfield to BS to evaluate patient.  Dr. Brumfield spoke to patient regarding need for admission and atrial fibrillation and risks, especially with no treatment.  Patient continues to state he doesn't want to be admitted.  Dr. Brumfield told patient if he wants to go home this evening despite doctor recommendation that he would have to sign an AMA form.  Patient states he is  willing to follow orders but doesn't want to stay in hospital.    1221 Report called to SAUL Castellanos, 2E.    1226 Patient leaving PACU with decreased heart rate and less labored breathing.  Denies CP/SOB.

## 2025-06-28 VITALS
WEIGHT: 136 LBS | HEIGHT: 66 IN | DIASTOLIC BLOOD PRESSURE: 70 MMHG | TEMPERATURE: 97.9 F | OXYGEN SATURATION: 93 % | BODY MASS INDEX: 21.86 KG/M2 | HEART RATE: 83 BPM | RESPIRATION RATE: 18 BRPM | SYSTOLIC BLOOD PRESSURE: 112 MMHG

## 2025-06-28 PROBLEM — Z01.818 PREOP TESTING: Status: ACTIVE | Noted: 2025-06-28

## 2025-06-28 LAB
ALBUMIN SERPL-MCNC: 3.1 G/DL (ref 3.4–5)
ALBUMIN/GLOB SERPL: 1.4 {RATIO} (ref 1.1–2.2)
ALP SERPL-CCNC: 77 U/L (ref 40–129)
ALT SERPL-CCNC: 21 U/L (ref 10–40)
ANION GAP SERPL CALCULATED.3IONS-SCNC: 8 MMOL/L (ref 9–17)
AST SERPL-CCNC: 20 U/L (ref 15–37)
BILIRUB SERPL-MCNC: 0.5 MG/DL (ref 0–1)
BUN SERPL-MCNC: 12 MG/DL (ref 7–20)
CALCIUM SERPL-MCNC: 8.7 MG/DL (ref 8.3–10.6)
CHLORIDE SERPL-SCNC: 106 MMOL/L (ref 99–110)
CO2 SERPL-SCNC: 27 MMOL/L (ref 21–32)
CREAT SERPL-MCNC: 0.6 MG/DL (ref 0.8–1.3)
EKG DIAGNOSIS: NORMAL
EKG Q-T INTERVAL: 372 MS
EKG QRS DURATION: 108 MS
EKG QTC CALCULATION (BAZETT): 434 MS
EKG R AXIS: 77 DEGREES
EKG T AXIS: 45 DEGREES
EKG VENTRICULAR RATE: 82 BPM
GFR, ESTIMATED: >90 ML/MIN/1.73M2
GLUCOSE SERPL-MCNC: 94 MG/DL (ref 74–99)
INR PPP: 1.3
MAGNESIUM SERPL-MCNC: 1.9 MG/DL (ref 1.8–2.4)
PHOSPHATE SERPL-MCNC: 3.4 MG/DL (ref 2.5–4.9)
POTASSIUM SERPL-SCNC: 4 MMOL/L (ref 3.5–5.1)
PROT SERPL-MCNC: 5.3 G/DL (ref 6.4–8.2)
PROTHROMBIN TIME: 16.4 SEC (ref 11.7–14.5)
SODIUM SERPL-SCNC: 141 MMOL/L (ref 136–145)

## 2025-06-28 PROCEDURE — 85610 PROTHROMBIN TIME: CPT

## 2025-06-28 PROCEDURE — G0378 HOSPITAL OBSERVATION PER HR: HCPCS

## 2025-06-28 PROCEDURE — 6370000000 HC RX 637 (ALT 250 FOR IP): Performed by: HOSPITALIST

## 2025-06-28 PROCEDURE — 84100 ASSAY OF PHOSPHORUS: CPT

## 2025-06-28 PROCEDURE — 6360000002 HC RX W HCPCS: Performed by: HOSPITALIST

## 2025-06-28 PROCEDURE — 93005 ELECTROCARDIOGRAM TRACING: CPT | Performed by: HOSPITALIST

## 2025-06-28 PROCEDURE — 94664 DEMO&/EVAL PT USE INHALER: CPT

## 2025-06-28 PROCEDURE — 83735 ASSAY OF MAGNESIUM: CPT

## 2025-06-28 PROCEDURE — APPNB15 APP NON BILLABLE TIME 0-15 MINS

## 2025-06-28 PROCEDURE — 96372 THER/PROPH/DIAG INJ SC/IM: CPT

## 2025-06-28 PROCEDURE — 6370000000 HC RX 637 (ALT 250 FOR IP): Performed by: INTERNAL MEDICINE

## 2025-06-28 PROCEDURE — 94640 AIRWAY INHALATION TREATMENT: CPT

## 2025-06-28 PROCEDURE — 80053 COMPREHEN METABOLIC PANEL: CPT

## 2025-06-28 PROCEDURE — 99233 SBSQ HOSP IP/OBS HIGH 50: CPT | Performed by: INTERNAL MEDICINE

## 2025-06-28 PROCEDURE — 93010 ELECTROCARDIOGRAM REPORT: CPT | Performed by: INTERNAL MEDICINE

## 2025-06-28 PROCEDURE — 94761 N-INVAS EAR/PLS OXIMETRY MLT: CPT

## 2025-06-28 PROCEDURE — 36415 COLL VENOUS BLD VENIPUNCTURE: CPT

## 2025-06-28 RX ORDER — METOPROLOL TARTRATE 50 MG
50 TABLET ORAL 2 TIMES DAILY
Status: DISCONTINUED | OUTPATIENT
Start: 2025-06-28 | End: 2025-06-28 | Stop reason: HOSPADM

## 2025-06-28 RX ORDER — CARBOXYMETHYLCELLULOSE SODIUM 10 MG/ML
1 GEL OPHTHALMIC 3 TIMES DAILY
Status: DISCONTINUED | OUTPATIENT
Start: 2025-06-28 | End: 2025-06-28

## 2025-06-28 RX ORDER — METOPROLOL TARTRATE 50 MG
50 TABLET ORAL 2 TIMES DAILY
Qty: 60 TABLET | Refills: 3 | Status: SHIPPED | OUTPATIENT
Start: 2025-06-28

## 2025-06-28 RX ADMIN — DAPSONE 25 MG: 25 TABLET ORAL at 09:03

## 2025-06-28 RX ADMIN — ASPIRIN 81 MG: 81 TABLET, CHEWABLE ORAL at 09:03

## 2025-06-28 RX ADMIN — BUDESONIDE AND FORMOTEROL FUMARATE DIHYDRATE 2 PUFF: 160; 4.5 AEROSOL RESPIRATORY (INHALATION) at 07:59

## 2025-06-28 RX ADMIN — CETIRIZINE HYDROCHLORIDE 5 MG: 10 TABLET, FILM COATED ORAL at 09:03

## 2025-06-28 RX ADMIN — TAMSULOSIN HYDROCHLORIDE 0.8 MG: 0.4 CAPSULE ORAL at 09:03

## 2025-06-28 RX ADMIN — OXYCODONE HYDROCHLORIDE AND ACETAMINOPHEN 1000 MG: 500 TABLET ORAL at 09:03

## 2025-06-28 RX ADMIN — ENOXAPARIN SODIUM 60 MG: 100 INJECTION SUBCUTANEOUS at 09:11

## 2025-06-28 RX ADMIN — Medication 1000 UNITS: at 09:03

## 2025-06-28 RX ADMIN — TIOTROPIUM BROMIDE INHALATION SPRAY 5 MCG: 3.12 SPRAY, METERED RESPIRATORY (INHALATION) at 07:59

## 2025-06-28 RX ADMIN — Medication 1000 MCG: at 09:03

## 2025-06-28 RX ADMIN — ROFLUMILAST 250 MCG: 500 TABLET ORAL at 09:04

## 2025-06-28 RX ADMIN — METOPROLOL TARTRATE 25 MG: 25 TABLET, FILM COATED ORAL at 06:51

## 2025-06-28 RX ADMIN — FUROSEMIDE 20 MG: 20 TABLET ORAL at 09:03

## 2025-06-28 RX ADMIN — Medication 1 TABLET: at 09:03

## 2025-06-28 NOTE — PROGRESS NOTES
Outpatient Pharmacy Progress Note for Meds-to-Beds    Total number of Prescriptions Filled: 2    Additional Documentation:  Medication(s) were delivered to the patient's room prior to discharge      Thank you for letting us serve your patients.  [address] [address]    Phone: [phone]    Fax: [phone]

## 2025-06-28 NOTE — PLAN OF CARE
Problem: Pain  Goal: Verbalizes/displays adequate comfort level or baseline comfort level  6/28/2025 0922 by Yoselin Can RN  Outcome: Progressing  6/28/2025 0227 by Saint Juste, Chedenine, RN  Outcome: Progressing  Flowsheets (Taken 6/27/2025 1603 by Danita Islas, RN)  Verbalizes/displays adequate comfort level or baseline comfort level: Encourage patient to monitor pain and request assistance     Problem: Discharge Planning  Goal: Discharge to home or other facility with appropriate resources  6/28/2025 0922 by Yoselin Can RN  Outcome: Progressing  6/28/2025 0227 by Saint Juste, Chedenine, RN  Outcome: Progressing     Problem: Risk for Elopement  Goal: Patient will not exit the unit/facility without proper excort  6/28/2025 0922 by Yoselin Can RN  Outcome: Progressing  Flowsheets  Taken 6/28/2025 0908 by Yoselin Can RN  Nursing Interventions for Elopement Risk: Assist with personal care needs such as toileting, eating, dressing, as needed to reduce the risk of wandering  Taken 6/28/2025 0320 by Saint Juste, Chedenine, RN  Nursing Interventions for Elopement Risk: Assist with personal care needs such as toileting, eating, dressing, as needed to reduce the risk of wandering  6/28/2025 0227 by Saint Juste, Chedenine, RN  Outcome: Progressing  Flowsheets  Taken 6/27/2025 2320 by Saint Juste, Chedenine, RN  Nursing Interventions for Elopement Risk: Assist with personal care needs such as toileting, eating, dressing, as needed to reduce the risk of wandering  Taken 6/27/2025 1920 by Saint Juste, Chedenine, RN  Nursing Interventions for Elopement Risk: Assist with personal care needs such as toileting, eating, dressing, as needed to reduce the risk of wandering  Taken 6/27/2025 1603 by Danita Islas, RN  Nursing Interventions for Elopement Risk: Assist with personal care needs such as toileting, eating, dressing, as needed to reduce the risk of wandering     Problem: Safety - Adult  Goal: Free from

## 2025-06-28 NOTE — PROGRESS NOTES
[facility] [place] [address]  Phone: [phone]    Fax [phone]                  Aj Post MD, [facility]       Chemo Holcomb MD, [facility]  Aicha León MD, [facility]    MD Imani Tellez MD Tariq Rizvi, MD Bilal Alam, MD Dr. Waseem Sajjad MD Melissa Kellis, ALICE Carbajal, APRCHRISTOPHER Bowles, APRCHRISTOPHER Marie, APRN  MOSES ZamudioC    CARDIOLOGY  NOTE      Name:  Kofi Pineda /Age/Sex: 1945  (80 y.o. male)   MRN & CSN:  [id number] & [id number] Admission Date/Time: 2025  7:59 AM   Location:  -A PCP: Charles Nicholson MD       Hospital Day: 2    - Cardiology consult is for:  New atrial fibrillation      ASSESSMENT/ PLAN:  New onset paroxysmal Atrial fibrillation  History of venous thromboembolism  - Patient has since flipped back into atrial fibrillation however rate is predominantly well-controlled.  -[id number]: 5 (age, Hx CHF, Hx of VTE)  -Goal potassium 4.0-4.5, magnesium 2.0-2.2.   -No significant coronary artery disease noted on left heart cath in August of last year.  - Echocardiogram in February revealing EF 55%.  Severe tricuspid agitation, biatrial enlargement and RV dilation noted  -Switch metoprolol to tartrate to 50 mg twice daily  -Patient states that he did not tolerate Xarelto in the past.  - Agreeable to take Eliquis 5 mg twice daily  -Consider outpatient BALBIR cardioversion and antiarrhythmic initiation  History of heart failure with preserved ejection fraction  History of ASD/PFO  -Currently appears euvolemic.  - On furosemide 20 mg every other day  Lung infiltrate  COPD  -Bronchoscopy withheld due to arrhythmia.  - Pulmonology following      Cardiology will sign off, please call with any further questions.  Patient follow-up with outpatient cardiologist Dr. Pena    Subjective:  Kofi is a 80 y.o.year old   Discussed care with  15.1* 16.4*   INR 1.2 1.3     APTT:   Recent Labs     25  1109   APTT 32.3     BNP:  No results for input(s): \"BNP\" in the last 72 hours.  TROPONIN: No results for input(s): \"TROPONINT\" in the last 72 hours.  Labs, consult, tests reviewed          Velasquez Arciniega PA-C 2025 12:54 PM     I have seen ,spoken to  and examined this patient personally, independently of the MAYCOL. I have reviewed the hospital care given to date and reviewed all pertinent labs and imaging.I have spoken with patient, nursing staff and provided written and verbal instructions .The above note has been reviewed     CARDIOLOGY ATTENDING ADDENDUM    HPI:  I have reviewed the above HPI  And agree with above     Pulse Range: Pulse  Av.2  Min: 78  Max: 89    Blood Presuure Range:  Systolic (24hrs), Av , Min:98 , Max:114   ; Diastolic (24hrs), Av, Min:65, Max:77      Pulse ox Range: SpO2  Av.5 %  Min: 92 %  Max: 98 %    24hr I & O:    Intake/Output Summary (Last 24 hours) at 2025 1746  Last data filed at 2025 0839  Gross per 24 hour   Intake 360 ml   Output --   Net 360 ml         /70   Pulse 83   Temp 97.9 °F (36.6 °C) (Oral)   Resp 18   Ht 1.676 m (5' 6\")   Wt 61.7 kg (136 lb)   SpO2 93%   BMI 21.95 kg/m²       Physical Exam:  General:  Awake, alert, NAD  Head:normal  Eye:normal  Neck:  No JVD   Chest:  Clear to auscultation, respiration easy  Cardiovascular:  RRR S1S2  Abdomen:   nontender  Extremities:  tr edema  Pulses; palpable  Neuro: grossly normal      MEDICAL DECISION MAKING;    Pt assessed , chart reviewed, patient examined examined , all available data was reviewed, following is the plan which was discussed with MAYCOL as well:    Atrial fibrillation new onset patient is anticoagulated is on Eliquis consider BALBIR cardioversion on Monday or as outpatient with patient's primary cardiologist who is in [place]    No significant CAD on the heart cath done in August of last year    HFpEF will

## 2025-06-28 NOTE — PROGRESS NOTES
Pulmonary and Critical Care  Progress Note    Subjective:   The patient has improved  Shortness of breath none at rest.  Chest pain none.  Addressing respiratory complaints Patient is negative for  hemoptysis and cyanosis  CONSTITUTIONAL:  negative for fevers and chills      Past Medical History:     has a past medical history of COPD (chronic obstructive pulmonary disease) (HCC) and Emphysema of lung (HCC).   has a past surgical history that includes Cataract removal (Right).   reports that he has quit smoking. His smoking use included cigarettes. He started smoking about 61 years ago. He has a 15.4 pack-year smoking history. He has been exposed to tobacco smoke. He has never used smokeless tobacco. He reports current alcohol use of about 3.0 standard drinks of alcohol per week. He reports that he does not use drugs.  Family history:  family history is not on file.    No Known Allergies  Social History:    Reviewed; no changes    Objective:   PHYSICAL EXAM:        VITALS:  /70   Pulse 83   Temp 97.9 °F (36.6 °C) (Oral)   Resp 18   Ht 1.676 m (5' 6\")   Wt 61.7 kg (136 lb)   SpO2 93%   BMI 21.95 kg/m²     24HR INTAKE/OUTPUT:    Intake/Output Summary (Last 24 hours) at 6/28/2025 1242  Last data filed at 6/28/2025 0839  Gross per 24 hour   Intake 360 ml   Output --   Net 360 ml       CONSTITUTIONAL:  awake, alert, cooperative, no apparent distress, and appears stated age  LUNGS:  decreased breath sounds,occ basilar crackles.  CARDIOVASCULAR:  normal S1 and S2 and negative JVD  ABD:Abdomen soft, non-tender. BS normal. No masses,  No organomegaly  NEURO:Alert and oriented x3. Gait normal. Reflexes and motor strength normal and symmetric. Cranial nerves 2-12 and sensation grossly intact.  DATA:    CBC:  Recent Labs     06/26/25  1109 06/27/25  1400   WBC 5.7 5.7   RBC 4.42* 3.85*   HGB 14.0 12.3*   HCT 45.0 38.8*    165   .8* 100.8*   MCH 31.7* 31.9*   MCHC 31.1* 31.7*   RDW 13.5 13.6

## 2025-06-28 NOTE — DISCHARGE SUMMARY
V2.0  Discharge Summary    Name:  Kofi Pineda /Age/Sex: 1945 (80 y.o. male)   Admit Date: 2025  Discharge Date: 25    MRN & CSN:  [id number] & [id number] Encounter Date and Time 25 12:41 PM EDT    Attending:  Devendra Brumfield MD Discharging Provider: ALICE GONZALEZ [facility]       Hospital Course:     Brief HPI: Kofi Pineda is a 80 y.o. male who presented with New Onset A-fib with RVR right before an outpatient Bronchoscopy with Dr. De Leon. The bronchoscopy was cancelled and he was  admitted for a Cardiology Consult. He is medically stable for discharge home today on Eliquis and Lopressor. He will follow up with his Cardiologist and already has an appointment coming up soon.     Brief Problem Based Course:     New onset A-fib with RVR-prior to bronchoscopy, patient went into A-fib with RVR.  He received 2.5 mg of metoprolol. Was titrated on PO metorprolol.   [id number]: 2.  Placed on therapeutic Lovenox.  Cardiology consult.  Check TSH.  Check troponins.  EKG shows A-fib with RVR.  Recent heart cath 2024: Normal coronary arteries; moderate pulmonary hypertension due to right ventricular volume overload.  ECHO: normal LV function EF 55-60%, intracardiac shunt of left to right as before due to hx of ASD or PFO, severe pulmonary hypertension, right ventricle and atrium severely dilated, moderate tricuspid regurgitation, thickened aortic cusps,    Lung infiltrate-CT chest 2025 shows right lower lobe consolidation with debris in right lower lobe bronchus.  Patient has been treated with antibiotics and steroids.  Patient was to have bronchoscopy today, but it was canceled due to patient going into A-fib with RVR.  Atrial septal aneurysm-BALBIR 2025 showed fenestrated ASD with predominantly left to right flow  Moderate-severe TR-had BALBIR on 2025 which showed moderate-severely dilated right ventricle and severe TR.  COPD-Singulair, Breztri, Daliresp,  MG tablet  Commonly known as: XYZAL     montelukast 10 MG tablet  Commonly known as: SINGULAIR     MVI (CELEBRATE) CHEWABLE TABLET     Roflumilast 500 MCG tablet  Commonly known as: DALIRESP  Take 0.5 tablets by mouth daily 1/2 qd     tamsulosin 0.4 MG capsule  Commonly known as: FLOMAX  Take 2 capsules by mouth daily     Vitamin B 12 500 MCG Tabs     vitamin C 1000 MG tablet     Vitamin D3 75 MCG (3000 UT) Tabs            STOP taking these medications      predniSONE 10 MG tablet  Commonly known as: DELTASONE               Where to Get Your Medications        These medications were sent to [address] - P [phone] - F [phone]  [address] [place] [id number]      Phone: [phone]   apixaban 5 MG Tabs tablet  metoprolol tartrate 50 MG tablet        Objective Findings at Discharge:   /70   Pulse 83   Temp 97.9 °F (36.6 °C) (Oral)   Resp 18   Ht 1.676 m (5' 6\")   Wt 61.7 kg (136 lb)   SpO2 93%   BMI 21.95 kg/m²       Physical Exam:   General: NAD  Cardiovascular: irregular rate and rhythm, normal S1/S2, no gallops or murmurs  Respiratory: Clear to auscultation bilaterally, no rhonchi, rales or wheezes   Neuro: Alert and oriented x 3   Psych: Mood appropriate.         Labs and Imaging   Echo (TTE) complete (PRN contrast/bubble/strain/3D)  Result Date: 6/27/2025    Image quality is adequate.   Left Ventricle: Normal left ventricular systolic function with a visually estimated EF of 55 - 60%. Left ventricle size is normal. Normal wall thickness. Normal wall motion.   Aortic Valve: Thickened cusps.   Tricuspid Valve: Moderate regurgitation with an eccentrically directed jet and may underestimate severity. RVSP is 64 mmHg suggestive of severe PHTN.   Right Atrium: Right atrium is severely dilated.  Prominent Chiari network noted   Right Ventricle: Right ventricle is severely dilated. Low normal systolic function.    for: \"LABURIN\"  Blood Cultures: No results found for: \"BC\"  No results found for: \"BLOODCULT2\"  Organism: No results found for: \"ORG\"    Time Spent Discharging patient 38 minutes    Electronically signed by ALICE GONZALEZ CNP on 6/28/2025 at 12:41 PM

## 2025-06-28 NOTE — PROGRESS NOTES
I did NOT see the patient. The patient was discussed with the MAYCOL. I was available for questions and consultation as needed.     HR stable.

## 2025-06-28 NOTE — PROGRESS NOTES
Patient O2 drop during the night and come right back up, patient placed on 1L of O2 during night was able to but weaned off to room air while awake.

## 2025-07-01 ENCOUNTER — TELEPHONE (OUTPATIENT)
Dept: FAMILY MEDICINE CLINIC | Age: 80
End: 2025-07-01

## 2025-07-01 NOTE — TELEPHONE ENCOUNTER
Care Transitions Initial Follow Up Call    Outreach made within 2 business days of discharge: Yes    Patient: Kofi Pineda Patient : 1945   MRN: [id number]  Reason for Admission: afib  Discharge Date: 25       Spoke with: .lmtrc to pt

## 2025-07-02 ENCOUNTER — TELEPHONE (OUTPATIENT)
Dept: FAMILY MEDICINE CLINIC | Age: 80
End: 2025-07-02

## 2025-07-02 NOTE — TELEPHONE ENCOUNTER
Care Transitions Initial Follow Up Call    Outreach made within 2 business days of discharge: Yes    Patient: Kofi Pineda Patient : 1945   MRN: [id number]  Reason for Admission: New onset a-fib  Discharge Date: 25       Spoke with: Left vm to return call.       Nichol Jon MA

## 2025-07-03 ENCOUNTER — TELEPHONE (OUTPATIENT)
Dept: FAMILY MEDICINE CLINIC | Age: 80
End: 2025-07-03

## 2025-07-11 RX ORDER — ROFLUMILAST 500 UG/1
250 TABLET ORAL DAILY
Qty: 45 TABLET | Refills: 1 | Status: SHIPPED | OUTPATIENT
Start: 2025-07-11

## 2025-07-28 PROBLEM — Z01.818 PREOP TESTING: Status: RESOLVED | Noted: 2025-06-28 | Resolved: 2025-07-28

## 2025-08-08 ENCOUNTER — TRANSCRIBE ORDERS (OUTPATIENT)
Dept: ADMINISTRATIVE | Age: 80
End: 2025-08-08

## 2025-08-08 DIAGNOSIS — R06.02 SHORTNESS OF BREATH: Primary | ICD-10-CM

## 2025-08-08 DIAGNOSIS — I27.21 PULMONARY ARTERY HYPERTENSION (HCC): ICD-10-CM

## 2025-08-13 ENCOUNTER — OFFICE VISIT (OUTPATIENT)
Dept: FAMILY MEDICINE CLINIC | Age: 80
End: 2025-08-13
Payer: MEDICARE

## 2025-08-13 VITALS
WEIGHT: 139.4 LBS | DIASTOLIC BLOOD PRESSURE: 70 MMHG | HEART RATE: 116 BPM | SYSTOLIC BLOOD PRESSURE: 116 MMHG | HEIGHT: 66 IN | OXYGEN SATURATION: 91 % | BODY MASS INDEX: 22.4 KG/M2

## 2025-08-13 VITALS
DIASTOLIC BLOOD PRESSURE: 70 MMHG | BODY MASS INDEX: 22.4 KG/M2 | RESPIRATION RATE: 18 BRPM | HEART RATE: 116 BPM | OXYGEN SATURATION: 91 % | WEIGHT: 139.4 LBS | HEIGHT: 66 IN | SYSTOLIC BLOOD PRESSURE: 116 MMHG

## 2025-08-13 DIAGNOSIS — I27.21 PULMONARY ARTERIAL HYPERTENSION (HCC): ICD-10-CM

## 2025-08-13 DIAGNOSIS — I48.91 NEW ONSET A-FIB (HCC): ICD-10-CM

## 2025-08-13 DIAGNOSIS — Z00.00 INITIAL MEDICARE ANNUAL WELLNESS VISIT: Primary | ICD-10-CM

## 2025-08-13 DIAGNOSIS — Z12.5 SCREENING FOR PROSTATE CANCER: ICD-10-CM

## 2025-08-13 DIAGNOSIS — J44.9 COPD, SEVERE (HCC): ICD-10-CM

## 2025-08-13 DIAGNOSIS — I73.9 PVD (PERIPHERAL VASCULAR DISEASE): ICD-10-CM

## 2025-08-13 DIAGNOSIS — R06.09 DOE (DYSPNEA ON EXERTION): ICD-10-CM

## 2025-08-13 DIAGNOSIS — Z09 HOSPITAL DISCHARGE FOLLOW-UP: ICD-10-CM

## 2025-08-13 DIAGNOSIS — I48.91 NEW ONSET A-FIB (HCC): Primary | ICD-10-CM

## 2025-08-13 PROCEDURE — 1159F MED LIST DOCD IN RCRD: CPT | Performed by: INTERNAL MEDICINE

## 2025-08-13 PROCEDURE — 1160F RVW MEDS BY RX/DR IN RCRD: CPT | Performed by: INTERNAL MEDICINE

## 2025-08-13 PROCEDURE — G0438 PPPS, INITIAL VISIT: HCPCS | Performed by: INTERNAL MEDICINE

## 2025-08-13 PROCEDURE — 99214 OFFICE O/P EST MOD 30 MIN: CPT | Performed by: INTERNAL MEDICINE

## 2025-08-13 PROCEDURE — 1123F ACP DISCUSS/DSCN MKR DOCD: CPT | Performed by: INTERNAL MEDICINE

## 2025-08-13 PROCEDURE — 1111F DSCHRG MED/CURRENT MED MERGE: CPT | Performed by: INTERNAL MEDICINE

## 2025-08-13 RX ORDER — CLOBETASOL PROPIONATE 0.5 MG/G
OINTMENT TOPICAL
COMMUNITY

## 2025-08-13 RX ORDER — CILOSTAZOL 50 MG/1
50 TABLET ORAL 2 TIMES DAILY
COMMUNITY
Start: 2025-08-04

## 2025-08-13 ASSESSMENT — PATIENT HEALTH QUESTIONNAIRE - PHQ9
SUM OF ALL RESPONSES TO PHQ QUESTIONS 1-9: 2
1. LITTLE INTEREST OR PLEASURE IN DOING THINGS: SEVERAL DAYS
2. FEELING DOWN, DEPRESSED OR HOPELESS: SEVERAL DAYS

## 2025-08-13 ASSESSMENT — LIFESTYLE VARIABLES
HOW OFTEN DO YOU HAVE A DRINK CONTAINING ALCOHOL: 2-3 TIMES A WEEK
HOW MANY STANDARD DRINKS CONTAINING ALCOHOL DO YOU HAVE ON A TYPICAL DAY: 1 OR 2

## 2025-08-14 LAB
ALBUMIN SERPL-MCNC: 3.4 G/DL (ref 3.4–5)
ALBUMIN/GLOB SERPL: 1.4 {RATIO} (ref 1.1–2.2)
ALP SERPL-CCNC: 84 U/L (ref 40–129)
ALT SERPL-CCNC: 16 U/L (ref 10–40)
ANION GAP SERPL CALCULATED.3IONS-SCNC: 9 MMOL/L (ref 3–16)
AST SERPL-CCNC: 19 U/L (ref 15–37)
BASOPHILS # BLD: 0 K/UL (ref 0–0.2)
BASOPHILS NFR BLD: 0.6 %
BILIRUB SERPL-MCNC: 0.5 MG/DL (ref 0–1)
BUN SERPL-MCNC: 11 MG/DL (ref 7–20)
CALCIUM SERPL-MCNC: 8.6 MG/DL (ref 8.3–10.6)
CHLORIDE SERPL-SCNC: 100 MMOL/L (ref 99–110)
CHOLEST SERPL-MCNC: 145 MG/DL (ref 0–199)
CO2 SERPL-SCNC: 32 MMOL/L (ref 21–32)
CREAT SERPL-MCNC: 0.7 MG/DL (ref 0.8–1.3)
DEPRECATED RDW RBC AUTO: 14.8 % (ref 12.4–15.4)
EOSINOPHIL # BLD: 0.1 K/UL (ref 0–0.6)
EOSINOPHIL NFR BLD: 1.8 %
GFR SERPLBLD CREATININE-BSD FMLA CKD-EPI: >90 ML/MIN/{1.73_M2}
GLUCOSE SERPL-MCNC: 93 MG/DL (ref 70–99)
HCT VFR BLD AUTO: 36.7 % (ref 40.5–52.5)
HDLC SERPL-MCNC: 50 MG/DL (ref 40–60)
HGB BLD-MCNC: 12.1 G/DL (ref 13.5–17.5)
LDLC SERPL CALC-MCNC: 87 MG/DL
LYMPHOCYTES # BLD: 0.7 K/UL (ref 1–5.1)
LYMPHOCYTES NFR BLD: 12.8 %
MCH RBC QN AUTO: 31.9 PG (ref 26–34)
MCHC RBC AUTO-ENTMCNC: 32.8 G/DL (ref 31–36)
MCV RBC AUTO: 97.2 FL (ref 80–100)
MONOCYTES # BLD: 0.5 K/UL (ref 0–1.3)
MONOCYTES NFR BLD: 9.6 %
NEUTROPHILS # BLD: 4 K/UL (ref 1.7–7.7)
NEUTROPHILS NFR BLD: 75.2 %
PLATELET # BLD AUTO: 284 K/UL (ref 135–450)
PMV BLD AUTO: 6.8 FL (ref 5–10.5)
POTASSIUM SERPL-SCNC: 4 MMOL/L (ref 3.5–5.1)
PROT SERPL-MCNC: 5.9 G/DL (ref 6.4–8.2)
PSA SERPL DL<=0.01 NG/ML-MCNC: 2.02 NG/ML (ref 0–4)
RBC # BLD AUTO: 3.77 M/UL (ref 4.2–5.9)
SODIUM SERPL-SCNC: 141 MMOL/L (ref 136–145)
TRIGL SERPL-MCNC: 42 MG/DL (ref 0–150)
VLDLC SERPL CALC-MCNC: 8 MG/DL
WBC # BLD AUTO: 5.3 K/UL (ref 4–11)

## 2025-08-15 ENCOUNTER — TRANSCRIBE ORDERS (OUTPATIENT)
Dept: ADMINISTRATIVE | Age: 80
End: 2025-08-15

## 2025-08-15 ENCOUNTER — TELEPHONE (OUTPATIENT)
Dept: FAMILY MEDICINE CLINIC | Age: 80
End: 2025-08-15

## 2025-08-15 DIAGNOSIS — I27.21 PULMONARY ARTERIAL HYPERTENSION (HCC): ICD-10-CM

## 2025-08-15 DIAGNOSIS — I36.1 NONRHEUMATIC TRICUSPID VALVE REGURGITATION: ICD-10-CM

## 2025-08-15 DIAGNOSIS — R06.02 SHORTNESS OF BREATH: Primary | ICD-10-CM

## (undated) DEVICE — TRAP,MUCUS SPECIMEN,40CC: Brand: MEDLINE

## (undated) DEVICE — TUBE ET DIA9MM NSL ORAL BASIC CUF MURPHY EYE RADPQ MRK

## (undated) DEVICE — CONMED DISPOSABLE BRONCHIAL CYTOLOGY BRUSH, STRAIGHT HANDLE, Ø2 MM: Brand: CONMED

## (undated) DEVICE — SMALL BOWL SET PLUS-LF: Brand: MEDLINE INDUSTRIES, INC.

## (undated) DEVICE — Device

## (undated) DEVICE — ENDOSCOPY KIT: Brand: MEDLINE INDUSTRIES, INC.

## (undated) DEVICE — GLOVE ORANGE PI 8   MSG9080

## (undated) DEVICE — SYRINGE MED 10ML SLIP TIP BLNT FILL AND LUERLOCK DISP

## (undated) DEVICE — GOWN ISOLATN XL YEL M WT SIDE TIE LEV 2

## (undated) DEVICE — BW-411B DISP COMBO CLEANING BRUSH: Brand: SINGLE USE COMBINATION CLEANING BRUSH

## (undated) DEVICE — 1870+ HEALTH CARE PART RESP. 120/CASE: Brand: AURA™

## (undated) DEVICE — VALVE BX DISP

## (undated) DEVICE — SINGLE-USE BIOPSY FORCEPS: Brand: RADIAL JAW 4

## (undated) DEVICE — SINGLE USE SUCTION VALVE MAJ-209: Brand: SINGLE USE SUCTION VALVE (STERILE)